# Patient Record
Sex: FEMALE | HISPANIC OR LATINO | Employment: FULL TIME | URBAN - METROPOLITAN AREA
[De-identification: names, ages, dates, MRNs, and addresses within clinical notes are randomized per-mention and may not be internally consistent; named-entity substitution may affect disease eponyms.]

---

## 2017-05-16 ENCOUNTER — TELEPHONE (OUTPATIENT)
Dept: MEDICAL GROUP | Facility: PHYSICIAN GROUP | Age: 32
End: 2017-05-16

## 2017-05-16 NOTE — TELEPHONE ENCOUNTER
ESTABLISHED PATIENT PRE-VISIT PLANNING     Note: Patient will not be contacted if there is no indication to call.     1.  Reviewed note from last office visit with PCP and/or other med group provider: Yes    2.  If any orders were placed at last visit, do we have Results/Consult Notes?        •  Labs - Labs were not ordered at last office visit.       •  Imaging - Imaging ordered, NOT completed. Patient advised to complete prior to next appointment.       •  Referrals - Referral ordered, patient was seen and consult notes are in chart. Care Teams updated  YES.    3.  Immunizations were updated in Epic using WebIZ?: No WebIZ record       •  Web Iz Recommendations: Unknown    4.  Patient is due for the following Health Maintenance Topics:   Health Maintenance Due   Topic Date Due   • IMM DTaP/Tdap/Td Vaccine (1 - Tdap) 12/19/2006   • PAP SMEAR  05/20/2016       - Patient already has appointment scheduled for Immunizations: TDAP. Talk to pt at appointment    5.  Patient was not informed to arrive 15 min prior to their scheduled appointment and bring in their medication bottles.

## 2017-05-17 ENCOUNTER — OFFICE VISIT (OUTPATIENT)
Dept: MEDICAL GROUP | Facility: PHYSICIAN GROUP | Age: 32
End: 2017-05-17
Payer: COMMERCIAL

## 2017-05-17 VITALS
HEART RATE: 77 BPM | SYSTOLIC BLOOD PRESSURE: 112 MMHG | TEMPERATURE: 98.8 F | OXYGEN SATURATION: 98 % | BODY MASS INDEX: 23.95 KG/M2 | RESPIRATION RATE: 16 BRPM | DIASTOLIC BLOOD PRESSURE: 66 MMHG | HEIGHT: 60 IN | WEIGHT: 122 LBS

## 2017-05-17 DIAGNOSIS — B35.1 ONYCHOMYCOSIS: ICD-10-CM

## 2017-05-17 DIAGNOSIS — E78.5 DYSLIPIDEMIA: ICD-10-CM

## 2017-05-17 PROCEDURE — 99213 OFFICE O/P EST LOW 20 MIN: CPT | Performed by: INTERNAL MEDICINE

## 2017-05-17 NOTE — MR AVS SNAPSHOT
Pauline Gaytan   2017 3:40 PM   Office Visit   MRN: [id number]    Department:  [facility]   Dept Phone:  [phone]    Description:  Female : 1985   Provider:  Kandis Muniz M.D.           Allergies as of 2017     Allergen Noted Reactions    Nkda [No Known Drug Allergy] 2013         You were diagnosed with     Onychomycosis   [805467]       Dyslipidemia   [973005]         Vital Signs     Blood Pressure Pulse Temperature Respirations Height Weight    112/66 mmHg 77 37.1 °C (98.8 °F) 16 1.524 m (5') 55.339 kg (122 lb)    Body Mass Index Oxygen Saturation Smoking Status             23.83 kg/m2 98% Never Smoker          Basic Information     Date Of Birth Sex Race Ethnicity Preferred Language    1985 Female  or   Origin (Bengali,Monegasque,Cymraes,Yuriy, etc) English      Problem List              ICD-10-CM Priority Class Noted - Resolved    Hematuria of undiagnosed cause R31.9   2013 - Present    Allergic reaction T78.40XA   2015 - Present      Health Maintenance        Date Due Completion Dates    IMM DTaP/Tdap/Td Vaccine (1 - Tdap) 2006 ---    PAP SMEAR 2016            Current Immunizations     No immunizations on file.      Below and/or attached are the medications your provider expects you to take. Review all of your home medications and newly ordered medications with your provider and/or pharmacist. Follow medication instructions as directed by your provider and/or pharmacist. Please keep your medication list with you and share with your provider. Update the information when medications are discontinued, doses are changed, or new medications (including over-the-counter products) are added; and carry medication information at all times in the event of emergency situations     Allergies:  NKDA - (reactions not documented)               Medications  Valid as of: May 17, 2017 -  4:22 PM    Generic Name Brand Name  Tablet Size Instructions for use    Ciclopirox (Solution) PENLAC 8 % 1 application to affected nail QHS        HydrOXYzine HCl (Tab) ATARAX 25 MG Take 1 Tab by mouth 3 times a day as needed for Itching.        MetroNIDAZOLE (Tab) FLAGYL 500 MG Take 500 mg by mouth 3 times a day.        PredniSONE (Tab) DELTASONE 20 MG Take 3 tab po with food for 3 days, then 2 tabs po with food for 3 days, then 1 tab po with food for 3 days, then 1/2 tab po with food for 4 days.        Probiotic Product (Cap) PROBIOTIC DAILY  Take  by mouth.        Triamcinolone Acetonide (Cream) KENALOG 0.1 % Apply to affected area(s) 2 times a day.        Triamcinolone Acetonide (Cream) KENALOG 0.1 % Apply 0.5 g to affected area(s) 2 times a day.        .                 Medicines prescribed today were sent to:     None      Medication refill instructions:       If your prescription bottle indicates you have medication refills left, it is not necessary to call your provider’s office. Please contact your pharmacy and they will refill your medication.    If your prescription bottle indicates you do not have any refills left, you may request refills at any time through one of the following ways: The online Vehrity system (except Urgent Care), by calling your provider’s office, or by asking your pharmacy to contact your provider’s office with a refill request. Medication refills are processed only during regular business hours and may not be available until the next business day. Your provider may request additional information or to have a follow-up visit with you prior to refilling your medication.   *Please Note: Medication refills are assigned a new Rx number when refilled electronically. Your pharmacy may indicate that no refills were authorized even though a new prescription for the same medication is available at the pharmacy. Please request the medicine by name with the pharmacy before contacting your provider for a refill.        Your To Do  List     Future Labs/Procedures Complete By Expires    LIPOPROTEIN A  As directed 5/17/2018         Modumetal Access Code: [email or web address]  Expires: 6/2/2017  2:28 PM    Modumetal  A secure, online tool to manage your health information     [employer]’s Modumetal® is a secure, online tool that connects you to your personalized health information from the privacy of your home -- day or night - making it very easy for you to manage your healthcare. Once the activation process is completed, you can even access your medical information using the Modumetal michele, which is available for free in the Apple Michele store or Google Play store.     Modumetal provides the following levels of access (as shown below):   My Chart Features   [facility] Doctor [facility]  Specialists [facility]  Urgent  Care [facility]  Primary Care  Doctor   Email your healthcare team securely and privately 24/7 X X X    Manage appointments: schedule your next appointment; view details of past/upcoming appointments X      Request prescription refills. X      View recent personal medical records, including lab and immunizations X X X X   View health record, including health history, allergies, medications X X X X   Read reports about your outpatient visits, procedures, consult and ER notes X X X X   See your discharge summary, which is a recap of your hospital and/or ER visit that includes your diagnosis, lab results, and care plan. X X       How to register for Modumetal:  1. Go to  [email or web address].  2. Click on the Sign Up Now box, which takes you to the New Member Sign Up page. You will need to provide the following information:  a. Enter your Modumetal Access Code exactly as it appears at the top of this page. (You will not need to use this code after you’ve completed the sign-up process. If you do not sign up before the expiration date, you must request a new code.)   b. Enter your date of birth.   c. Enter your home email address.   d. Click  Submit, and follow the next screen’s instructions.  3. Create a Peeriot ID. This will be your Rocketskates login ID and cannot be changed, so think of one that is secure and easy to remember.  4. Create a Peeriot password. You can change your password at any time.  5. Enter your Password Reset Question and Answer. This can be used at a later time if you forget your password.   6. Enter your e-mail address. This allows you to receive e-mail notifications when new information is available in Rocketskates.  7. Click Sign Up. You can now view your health information.    For assistance activating your Rocketskates account, call [phone]

## 2017-05-18 NOTE — PROGRESS NOTES
Subjective:  Pauline is a 31 y.o. female with the following   Past Medical History   Diagnosis Date   • ASTHMA      has a inhailer   • Allergic reaction 8/17/2015      Family History   Problem Relation Age of Onset   • Hypertension Mother    • Hypertension Father    • Arthritis Maternal Grandmother      The patient is on the following medications: Penlac 8% solution    HPI; patient is seen today for follow-up visit, concerned about toe discoloration and thickening she's been using Penlac for couple of years and has seen no significant improvement, requesting alternative therapies, denies having foot pain, erythema or swelling.       ROS:  See HPI    Blood pressure 112/66, pulse 77, temperature 37.1 °C (98.8 °F), resp. rate 16, height 1.524 m (5'), weight 55.339 kg (122 lb), SpO2 98 %, currently breastfeeding.on RA  Objective:  Patient is well appearing and in no acute distress.  Pharynx is clear.  Neck is soft and supple with no cervical or supraclavicular lymphadenopathy, thyromegaly or masses, no JVD.  Lungs clear to auscultation bilaterally with normal respiratory effort. Abdomen soft, non-tender on palpation,not distended. Heart regular rate and rhythm without murmur. Extremities without any clubbing, cyanosis, or edema. Toenail ; discoloration and thickening .      Assessment and Plan:  1. Onychomycosis ; partial response to Penlac solution.     2. Preventive health ; up-to-date on Pap smears by GYN.     3. History of dyslipidemia.       Patient is advised on preventive and supportive care regarding onychomycosis, therapeutic options, for oral medication need to have enough toenail samples to be sent to the laboratory for definitive diagnosis and follow-up with Liver enzymes before and after therapy as indicated. Otherwise she was advised regarding alternative therapies for onychomycosis, informed her that any treatment takes months of  therapy to be effective. Also discussed diet and lifestyle modification and  follow-up with lipid profile. .         Please note that this dictation was created using voice recognition software. I have worked with consultants from the vendor as well as technical experts from [facility] to optimize the interface. I have made every reasonable attempt to correct obvious errors, but I expect that there are errors of grammar and possibly content that I did not discover before finalizing the note.

## 2018-01-08 ENCOUNTER — OFFICE VISIT (OUTPATIENT)
Dept: MEDICAL GROUP | Facility: PHYSICIAN GROUP | Age: 33
End: 2018-01-08
Payer: COMMERCIAL

## 2018-01-08 VITALS
RESPIRATION RATE: 16 BRPM | SYSTOLIC BLOOD PRESSURE: 96 MMHG | OXYGEN SATURATION: 99 % | WEIGHT: 127 LBS | BODY MASS INDEX: 24.94 KG/M2 | DIASTOLIC BLOOD PRESSURE: 70 MMHG | TEMPERATURE: 98.4 F | HEART RATE: 70 BPM | HEIGHT: 60 IN

## 2018-01-08 DIAGNOSIS — M54.2 NECK PAIN: ICD-10-CM

## 2018-01-08 DIAGNOSIS — M79.601 RIGHT ARM PAIN: ICD-10-CM

## 2018-01-08 DIAGNOSIS — B35.1 ONYCHOMYCOSIS: ICD-10-CM

## 2018-01-08 PROCEDURE — 99213 OFFICE O/P EST LOW 20 MIN: CPT | Performed by: INTERNAL MEDICINE

## 2018-01-08 RX ORDER — CICLOPIROX 80 MG/ML
SOLUTION TOPICAL
Qty: 6.6 ML | Refills: 3 | Status: SHIPPED | OUTPATIENT
Start: 2018-01-08 | End: 2020-01-02

## 2018-01-08 ASSESSMENT — PATIENT HEALTH QUESTIONNAIRE - PHQ9: CLINICAL INTERPRETATION OF PHQ2 SCORE: 0

## 2018-01-08 ASSESSMENT — PAIN SCALES - GENERAL: PAINLEVEL: NO PAIN

## 2018-01-08 NOTE — PROGRESS NOTES
Subjective:  Pauline is a 32 y.o. female with the following   Past Medical History:   Diagnosis Date   • Allergic reaction 8/17/2015   • ASTHMA     has a inhailer      Family History   Problem Relation Age of Onset   • Hypertension Mother    • Hypertension Father    • Arthritis Maternal Grandmother      The patient is on the following medications:   Current Outpatient Prescriptions:   •  ciclopirox (PENLAC) 8 % solution, 1 application to affected nail QHS, Disp: 6.6 mL, Rfl: 3  •  Probiotic Product (PROBIOTIC DAILY) Cap, Take  by mouth., Disp: , Rfl:   •  metronidazole (FLAGYL) 500 MG Tab, Take 500 mg by mouth 3 times a day., Disp: , Rfl:   •  predniSONE (DELTASONE) 20 MG Tab, Take 3 tab po with food for 3 days, then 2 tabs po with food for 3 days, then 1 tab po with food for 3 days, then 1/2 tab po with food for 4 days. (Patient not taking: Reported on 1/28/2016), Disp: 20 Tab, Rfl: 0  •  hydrOXYzine (ATARAX) 25 MG Tab, Take 1 Tab by mouth 3 times a day as needed for Itching. (Patient not taking: Reported on 1/28/2016), Disp: 30 Tab, Rfl: 0  •  triamcinolone acetonide (KENALOG) 0.1 % Cream, Apply 0.5 g to affected area(s) 2 times a day. (Patient not taking: Reported on 1/28/2016), Disp: 2 Tube, Rfl: 0  •  triamcinolone acetonide (KENALOG) 0.1 % CREA, Apply to affected area(s) 2 times a day. (Patient not taking: Reported on 1/28/2016), Disp: 1 Tube, Rfl: 1    HPI; Patient is here today concerned about her intermittent upper shoulder, neck and right arm aching associated with tingling and  burning pain especially when waking up in the morning and gradually improves with activity, denies having fall, injury, muscular weakness or loss of sensation. Patient is also requesting prescription of topical Penlac for toenails onychomycosis, stating that combination therapy of sodium bicarbonate soaking and Penlac application seems to be helping.        ROS: All other systems reviewed and they are negative.    Blood pressure (!)  96/70, pulse 70, temperature 36.9 °C (98.4 °F), resp. rate 16, height 1.524 m (5'), weight 57.6 kg (127 lb), SpO2 99 %, not currently breastfeeding.on RA        Objective:      Patient is well appearing and in no acute distress.  Eyes; pupils are equally reactive to light and accommodation. Ears are clear, no tympanic membranes bulging. Pharynx is clear.  Neck is soft and supple, nontender,no thyromegaly or mass.  lymphatic;  no cervical or supraclavicular lymphadenopathy.  Respiratory;  Lungs clear to auscultation bilaterally with normal respiratory effort. Gastrointestinal; abdomen is soft, non-tender on palpation,not distended. Cardiovascular ; Heart regular rate and rhythm without murmur, no JVD.  Extremities without any clubbing, cyanosis, or edema. Neuro - psychiatric ;  alert and oriented to time, location and person, motor and sensory intact. Skin; no rash or erythema. Musculoskeletal; no tenderness on palpation, no joint swelling or erythema    Assessment ;   1. Recurrent right upper shoulder, neck and right arm neuropathic pain. Usually in the morning after waking up, improves with activity. Physical exam is unremarkable, currently is asymptomatic.      2. Onychomycosis; requesting refill of topical Penlac, combination of sodium bicarbonate soaking and topical antifungal seems to be helping..    Plan; Patient is advised on preventive and supportive care regarding current symptoms and potential underlying etiologies including nerve impingement, instructed on upper back and neck extension exercises, appropriate posture and pillow size, If symptoms not improved or worsen return for evaluation. Again discussed alternative therapies for onychomycosis, refilled Penlac solution..    Please note that this dictation was created using voice recognition software. I have worked with consultants from the vendor as well as technical experts from [facility] [employer] to optimize the interface. I have made every reasonable  attempt to correct obvious errors, but I expect that there are errors of grammar and possibly content that I did not discover before finalizing the note.

## 2018-02-07 ENCOUNTER — HOSPITAL ENCOUNTER (OUTPATIENT)
Facility: MEDICAL CENTER | Age: 33
End: 2018-02-07
Attending: OBSTETRICS & GYNECOLOGY
Payer: COMMERCIAL

## 2018-02-07 ENCOUNTER — GYNECOLOGY VISIT (OUTPATIENT)
Dept: OBGYN | Facility: MEDICAL CENTER | Age: 33
End: 2018-02-07
Payer: COMMERCIAL

## 2018-02-07 VITALS
HEIGHT: 60 IN | DIASTOLIC BLOOD PRESSURE: 70 MMHG | WEIGHT: 128 LBS | SYSTOLIC BLOOD PRESSURE: 100 MMHG | BODY MASS INDEX: 25.13 KG/M2

## 2018-02-07 DIAGNOSIS — Z11.51 SPECIAL SCREENING EXAMINATION FOR HUMAN PAPILLOMAVIRUS (HPV): ICD-10-CM

## 2018-02-07 DIAGNOSIS — Z12.4 SCREENING FOR MALIGNANT NEOPLASM OF CERVIX: ICD-10-CM

## 2018-02-07 DIAGNOSIS — Z97.5 IUD (INTRAUTERINE DEVICE) IN PLACE: ICD-10-CM

## 2018-02-07 DIAGNOSIS — Z01.419 WELL WOMAN EXAM: ICD-10-CM

## 2018-02-07 DIAGNOSIS — R19.00 PELVIC MASS IN FEMALE: ICD-10-CM

## 2018-02-07 PROCEDURE — 88175 CYTOPATH C/V AUTO FLUID REDO: CPT

## 2018-02-07 PROCEDURE — 99385 PREV VISIT NEW AGE 18-39: CPT | Performed by: OBSTETRICS & GYNECOLOGY

## 2018-02-07 PROCEDURE — 87624 HPV HI-RISK TYP POOLED RSLT: CPT

## 2018-02-07 NOTE — PROGRESS NOTES
ANNUAL Gynecologic Exam     HPI Comments:   32 YEAR OLD  presents for well woman exam and establish gyn care  She has the IUD mirena - placed in   Patient's last menstrual period was 2014.  Occasional light vaginal spotting  No pelvic pains, no dyspareunia  (+) right breast pain - points to area where bra's under wire is  Healthy  Never smoker  No family history of breast/ovarian cancer    Review of Systems   Pertinent positives documented in HPI and all other systems reviewed & are negative    All PMH, PSH, allergies, social history and FH reviewed and updated today:  Past Medical History:   Diagnosis Date   • Allergic reaction 2015   • ASTHMA     has a inhailer     No past surgical history on file.  Nkda [no known drug allergy]  Social History     Social History   • Marital status:      Spouse name: N/A   • Number of children: N/A   • Years of education: N/A     Social History Main Topics   • Smoking status: Never Smoker   • Smokeless tobacco: Never Used   • Alcohol use No   • Drug use: No   • Sexual activity: Yes     Partners: Male     Other Topics Concern   • Not on file     Social History Narrative   • No narrative on file     Family History   Problem Relation Age of Onset   • Hypertension Mother    • Hypertension Father    • Arthritis Maternal Grandmother      Medications:   Current Outpatient Prescriptions Ordered in [employer]   Medication Sig Dispense Refill   • ciclopirox (PENLAC) 8 % solution 1 application to affected nail QHS 6.6 mL 3   • metronidazole (FLAGYL) 500 MG Tab Take 500 mg by mouth 3 times a day.     • Probiotic Product (PROBIOTIC DAILY) Cap Take  by mouth.     • predniSONE (DELTASONE) 20 MG Tab Take 3 tab po with food for 3 days, then 2 tabs po with food for 3 days, then 1 tab po with food for 3 days, then 1/2 tab po with food for 4 days. (Patient not taking: Reported on 2016) 20 Tab 0   • hydrOXYzine (ATARAX) 25 MG Tab Take 1 Tab by mouth 3 times a day as needed  for Itching. (Patient not taking: Reported on 1/28/2016) 30 Tab 0   • triamcinolone acetonide (KENALOG) 0.1 % Cream Apply 0.5 g to affected area(s) 2 times a day. (Patient not taking: Reported on 1/28/2016) 2 Tube 0   • triamcinolone acetonide (KENALOG) 0.1 % CREA Apply to affected area(s) 2 times a day. (Patient not taking: Reported on 1/28/2016) 1 Tube 1     No current Epic-ordered facility-administered medications on file.       Objective:   Vital measurements:  Blood pressure 100/70, height 1.524 m (5'), weight 58.1 kg (128 lb), last menstrual period 02/01/2014.  Body mass index is 25 kg/m². (Goal BM I>18 <25)    Physical Exam   Nursing note and vitals reviewed.  Constitutional: She is oriented to person, place, and time. She appears well-developed and well-nourished. No distress.     HEENT:   Head: Normocephalic and atraumatic.   Right Ear: External ear normal.   Left Ear: External ear normal.   Nose: Nose normal.   Eyes: Conjunctivae and EOM are normal. Pupils are equal, round, and reactive to light. No scleral icterus.     Neck: Normal range of motion. Neck supple. No tracheal deviation present. No thyromegaly present.     Pulmonary/Chest: Effort normal and breath sounds normal. No respiratory distress. She has no wheezes. She has no rales. She exhibits no tenderness.     Cardiovascular: Regular, rate and rhythm. No JVD.    Abdominal: Soft. Bowel sounds are normal. She exhibits no distension and no mass. No tenderness. She has no rebound and no guarding.     Breast:  Symmetrical, normal consistency without masses., No dimpling or skin changes, negative, No masses    Genitourinary:  Pelvic exam was performed with patient supine.  External genitalia with no abnormal pigmentation, labial fusion,rash, tenderness, lesion or injury to the labia bilaterally.  Vagina is moist with no lesions, foul discharge, erythema, tenderness or bleeding. No foreign body around the vagina or signs of injury.   Cervix exhibits no  motion tenderness, no discharge and no friability. IUD string seen  Uterus is  retroverted deviated, not enlarged, not fixed and not tender.  (+) movable mass anterior to the uterus about 4-5 cm no tenderness    Musculoskeletal: Normal range of motion. She exhibits no edema and no tenderness.     Lymphadenopathy: She has no cervical adenopathy.     Neurological: She is alert and oriented to person, place, and time. She exhibits normal muscle tone.     Skin: Skin is warm and dry. No rash noted. She is not diaphoretic. No erythema. No pallor.     Psychiatric: She has a normal mood and affect. Her behavior is normal. Judgment and thought content normal  Assessment:     1. Well woman exam  THINPREP PAP WITH HPV   2. Screening for malignant neoplasm of cervix  THINPREP PAP WITH HPV   3. Special screening examination for human papillomavirus (HPV)  THINPREP PAP WITH HPV   4. Pelvic mass in female  US-GYN-PELVIS TRANSVAGINAL   5. IUD (intrauterine device) in place  US-GYN-PELVIS TRANSVAGINAL     Plan:   Pap and physical exam performed. Then q 3 years if normal   Monthly SBE. Pain that pt is complaining about seems like musculo-skeletal pain underneath right breast from under wire. Will d/c using under wire, will call gyn office if pain is worse or persistent for evaluation of right breast. Agreed with plan  Counseling: breast self exam, STD prevention, HIV risk factors and prevention and family planning choices. Pt will call office 1 month prior to expiration of IUD mirena for replacement - IUD due feb-march 2019  Encourage exercise and proper diet.  Mammograms starting @ age 40 annually.  See medications and orders placed in encounter report.

## 2018-02-08 LAB
CYTOLOGY REG CYTOL: NORMAL
HPV HR 12 DNA CVX QL NAA+PROBE: NEGATIVE
HPV16 DNA SPEC QL NAA+PROBE: NEGATIVE
HPV18 DNA SPEC QL NAA+PROBE: NEGATIVE
SPECIMEN SOURCE: NORMAL

## 2018-02-16 ENCOUNTER — HOSPITAL ENCOUNTER (OUTPATIENT)
Dept: RADIOLOGY | Facility: MEDICAL CENTER | Age: 33
End: 2018-02-16
Attending: OBSTETRICS & GYNECOLOGY
Payer: COMMERCIAL

## 2018-02-16 DIAGNOSIS — R19.00 PELVIC MASS IN FEMALE: ICD-10-CM

## 2018-02-16 DIAGNOSIS — Z97.5 IUD (INTRAUTERINE DEVICE) IN PLACE: ICD-10-CM

## 2018-02-16 PROCEDURE — 76830 TRANSVAGINAL US NON-OB: CPT

## 2018-02-26 ENCOUNTER — TELEPHONE (OUTPATIENT)
Dept: OBGYN | Facility: MEDICAL CENTER | Age: 33
End: 2018-02-26

## 2018-02-27 NOTE — TELEPHONE ENCOUNTER
Called yesterday and gave the pt her results and pt would like to be squeezed in anytime after 10 am as her insurance will be changing. Is this possible doctor? Please advise? Thank you

## 2018-08-16 ENCOUNTER — OFFICE VISIT (OUTPATIENT)
Dept: MEDICAL GROUP | Facility: MEDICAL CENTER | Age: 33
End: 2018-08-16
Payer: COMMERCIAL

## 2018-08-16 VITALS
DIASTOLIC BLOOD PRESSURE: 72 MMHG | HEIGHT: 60 IN | HEART RATE: 60 BPM | OXYGEN SATURATION: 97 % | TEMPERATURE: 98 F | BODY MASS INDEX: 25.13 KG/M2 | WEIGHT: 128 LBS | SYSTOLIC BLOOD PRESSURE: 116 MMHG

## 2018-08-16 DIAGNOSIS — Z00.00 ANNUAL PHYSICAL EXAM: ICD-10-CM

## 2018-08-16 DIAGNOSIS — B35.1 ONYCHOMYCOSIS OF TOENAIL: ICD-10-CM

## 2018-08-16 PROBLEM — G43.909 MIGRAINE: Status: ACTIVE | Noted: 2018-08-16

## 2018-08-16 PROBLEM — J45.20 MILD INTERMITTENT ASTHMA WITHOUT COMPLICATION: Status: ACTIVE | Noted: 2018-08-16

## 2018-08-16 PROCEDURE — 99214 OFFICE O/P EST MOD 30 MIN: CPT | Performed by: NURSE PRACTITIONER

## 2018-08-16 NOTE — PATIENT INSTRUCTIONS
Fungal Nail Infection  Introduction  Fungal nail infection is a common fungal infection of the toenails or fingernails. This condition affects toenails more often than fingernails. More than one nail may be infected. The condition can be passed from person to person (is contagious).  What are the causes?  This condition is caused by a fungus. Several types of funguses can cause the infection. These funguses are common in moist and warm areas. If your hands or feet come into contact with the fungus, it may get into a crack in your fingernail or toenail and cause the infection.  What increases the risk?  The following factors may make you more likely to develop this condition:  · Being male.  · Having diabetes.  · Being of older age.  · Living with someone who has the fungus.  · Walking barefoot in areas where the fungus thrives, such as showers or locker rooms.  · Having poor circulation.  · Wearing shoes and socks that cause your feet to sweat.  · Having athlete’s foot.  · Having a nail injury or history of a recent nail surgery.  · Having psoriasis.  · Having a weak body defense system (immune system).  What are the signs or symptoms?  Symptoms of this condition include:  · A pale spot on the nail.  · Thickening of the nail.  · A nail that becomes yellow or brown.  · A brittle or ragged nail edge.  · A crumbling nail.  · A nail that has lifted away from the nail bed.  How is this diagnosed?  This condition is diagnosed with a physical exam. Your health care provider may take a scraping or clipping from your nail to test for the fungus.  How is this treated?  Mild infections do not need treatment. If you have significant nail changes, treatment may include:  · Oral antifungal medicines. You may need to take the medicine for several weeks or several months, and you may not see the results for a long time. These medicines can cause side effects. Ask your health care provider what problems to watch for.  · Antifungal  nail polish and nail cream. These may be used along with oral antifungal medicines.  · Laser treatment of the nail.  · Surgery to remove the nail. This may be needed for the most severe infections.  Treatment takes a long time, and the infection may come back.  Follow these instructions at home:  Medicines  · Take or apply over-the-counter and prescription medicines only as told by your health care provider.  · Ask your health care provider about using over-the-counter mentholated ointment on your nails.  Lifestyle  · Do not share personal items, such as towels or nail clippers.  · Trim your nails often.  · Wash and dry your hands and feet every day.  · Wear absorbent socks, and change your socks frequently.  · Wear shoes that allow air to circulate, such as sandals or canvas tennis shoes. Throw out old shoes.  · Wear rubber gloves if you are working with your hands in wet areas.  · Do not walk barefoot in shower rooms or locker rooms.  · Do not use a nail salon that does not use clean instruments.  · Do not use artificial nails.  General instructions  · Keep all follow-up visits as told by your health care provider. This is important.  · Use antifungal foot powder on your feet and in your shoes.  Contact a health care provider if:  Your infection is not getting better or it is getting worse after several months.  This information is not intended to replace advice given to you by your health care provider. Make sure you discuss any questions you have with your health care provider.  Document Released: 12/15/2001 Document Revised: 05/25/2017 Document Reviewed: 06/20/2016  © 2017 Elsevier    Terbinafine tablets  What is this medicine?  TERBINAFINE (TER bin a feen) is an antifungal medicine. It is used to treat certain kinds of fungal or yeast infections.  This medicine may be used for other purposes; ask your health care provider or pharmacist if you have questions.  COMMON BRAND NAME(S): Lamisil, Terbinex  What  should I tell my health care provider before I take this medicine?  They need to know if you have any of these conditions:  -drink alcoholic beverages  -kidney disease  -liver disease  -an unusual or allergic reaction to terbinafine, other medicines, foods, dyes, or preservatives  -pregnant or trying to get pregnant  -breast-feeding  How should I use this medicine?  Take this medicine by mouth with a full glass of water. Follow the directions on the prescription label. You can take this medicine with food or on an empty stomach. Take your medicine at regular intervals. Do not take your medicine more often than directed. Do not skip doses or stop your medicine early even if you feel better. Do not stop taking except on your doctor's advice. Talk to your pediatrician regarding the use of this medicine in children. Special care may be needed.  Overdosage: If you think you have taken too much of this medicine contact a poison control center or emergency room at once.  NOTE: This medicine is only for you. Do not share this medicine with others.  What if I miss a dose?  If you miss a dose, take it as soon as you can. If it is almost time for your next dose, take only that dose. Do not take double or extra doses.  What may interact with this medicine?  Do not take this medicine with any of the following medications:  -thioridazine  This medicine may also interact with the following medications:  -beta-blockers  -caffeine  -cimetidine  -cyclosporine  -medicines for depression, anxiety, or psychotic disturbances  -medicines for fungal infections like fluconazole and ketoconazole  -medicines for irregular heartbeat like amiodarone, flecainide and propafenone  -rifampin  -warfarin  This list may not describe all possible interactions. Give your health care provider a list of all the medicines, herbs, non-prescription drugs, or dietary supplements you use. Also tell them if you smoke, drink alcohol, or use illegal drugs. Some  items may interact with your medicine.  What should I watch for while using this medicine?  Visit your doctor or health care provider regularly. Tell your doctor right away if you have nausea or vomiting, loss of appetite, stomach pain on your right upper side, yellow skin, dark urine, light stools, or are over tired. Some fungal infections need many weeks or months of treatment to cure. If you are taking this medicine for a long time, you will need to have important blood work done.  What side effects may I notice from receiving this medicine?  Side effects that you should report to your doctor or health care professional as soon as possible:  -allergic reactions like skin rash or hives, swelling of the face, lips, or tongue  -changes in vision  -dark urine  -fever or infection  -general ill feeling or flu-like symptoms  -light-colored stools  -loss of appetite, nausea  -redness, blistering, peeling or loosening of the skin, including inside the mouth  -right upper belly pain  -unusually weak or tired  -yellowing of the eyes or skin  Side effects that usually do not require medical attention (report to your doctor or health care professional if they continue or are bothersome):  -changes in taste  -diarrhea  -hair loss  -muscle or joint pain  -stomach gas  -stomach upset  This list may not describe all possible side effects. Call your doctor for medical advice about side effects. You may report side effects to FDA at 8-906-FDA-1339.  Where should I keep my medicine?  Keep out of the reach of children.  Store at room temperature below 25 degrees C (77 degrees F). Protect from light. Throw away any unused medicine after the expiration date.  NOTE: This sheet is a summary. It may not cover all possible information. If you have questions about this medicine, talk to your doctor, pharmacist, or health care provider.  © 2018 Elsevier/Gold Standard (2009-02-27 16:28:07)

## 2018-08-17 ENCOUNTER — HOSPITAL ENCOUNTER (OUTPATIENT)
Dept: LAB | Facility: MEDICAL CENTER | Age: 33
End: 2018-08-17
Attending: NURSE PRACTITIONER
Payer: COMMERCIAL

## 2018-08-17 ENCOUNTER — TELEPHONE (OUTPATIENT)
Dept: MEDICAL GROUP | Facility: MEDICAL CENTER | Age: 33
End: 2018-08-17

## 2018-08-17 DIAGNOSIS — B35.1 ONYCHOMYCOSIS: ICD-10-CM

## 2018-08-17 DIAGNOSIS — Z00.00 ANNUAL PHYSICAL EXAM: ICD-10-CM

## 2018-08-17 DIAGNOSIS — Z51.81 THERAPEUTIC DRUG MONITORING: ICD-10-CM

## 2018-08-17 LAB
25(OH)D3 SERPL-MCNC: 25 NG/ML (ref 30–100)
ALBUMIN SERPL BCP-MCNC: 4.2 G/DL (ref 3.2–4.9)
ALBUMIN/GLOB SERPL: 1.4 G/DL
ALP SERPL-CCNC: 41 U/L (ref 30–99)
ALT SERPL-CCNC: 15 U/L (ref 2–50)
ANION GAP SERPL CALC-SCNC: 5 MMOL/L (ref 0–11.9)
AST SERPL-CCNC: 19 U/L (ref 12–45)
BASOPHILS # BLD AUTO: 0.4 % (ref 0–1.8)
BASOPHILS # BLD: 0.02 K/UL (ref 0–0.12)
BILIRUB SERPL-MCNC: 0.7 MG/DL (ref 0.1–1.5)
BUN SERPL-MCNC: 13 MG/DL (ref 8–22)
CALCIUM SERPL-MCNC: 9.6 MG/DL (ref 8.5–10.5)
CHLORIDE SERPL-SCNC: 104 MMOL/L (ref 96–112)
CHOLEST SERPL-MCNC: 148 MG/DL (ref 100–199)
CO2 SERPL-SCNC: 27 MMOL/L (ref 20–33)
CREAT SERPL-MCNC: 0.88 MG/DL (ref 0.5–1.4)
EOSINOPHIL # BLD AUTO: 0.06 K/UL (ref 0–0.51)
EOSINOPHIL NFR BLD: 1.1 % (ref 0–6.9)
ERYTHROCYTE [DISTWIDTH] IN BLOOD BY AUTOMATED COUNT: 42.3 FL (ref 35.9–50)
GLOBULIN SER CALC-MCNC: 3 G/DL (ref 1.9–3.5)
GLUCOSE SERPL-MCNC: 91 MG/DL (ref 65–99)
HCT VFR BLD AUTO: 42.3 % (ref 37–47)
HDLC SERPL-MCNC: 48 MG/DL
HGB BLD-MCNC: 14 G/DL (ref 12–16)
IMM GRANULOCYTES # BLD AUTO: 0.01 K/UL (ref 0–0.11)
IMM GRANULOCYTES NFR BLD AUTO: 0.2 % (ref 0–0.9)
LDLC SERPL CALC-MCNC: 84 MG/DL
LYMPHOCYTES # BLD AUTO: 1.99 K/UL (ref 1–4.8)
LYMPHOCYTES NFR BLD: 36.1 % (ref 22–41)
MCH RBC QN AUTO: 30.9 PG (ref 27–33)
MCHC RBC AUTO-ENTMCNC: 33.1 G/DL (ref 33.6–35)
MCV RBC AUTO: 93.4 FL (ref 81.4–97.8)
MONOCYTES # BLD AUTO: 0.47 K/UL (ref 0–0.85)
MONOCYTES NFR BLD AUTO: 8.5 % (ref 0–13.4)
NEUTROPHILS # BLD AUTO: 2.97 K/UL (ref 2–7.15)
NEUTROPHILS NFR BLD: 53.7 % (ref 44–72)
NRBC # BLD AUTO: 0 K/UL
NRBC BLD-RTO: 0 /100 WBC
PLATELET # BLD AUTO: 229 K/UL (ref 164–446)
PMV BLD AUTO: 12.1 FL (ref 9–12.9)
POTASSIUM SERPL-SCNC: 4.7 MMOL/L (ref 3.6–5.5)
PROT SERPL-MCNC: 7.2 G/DL (ref 6–8.2)
RBC # BLD AUTO: 4.53 M/UL (ref 4.2–5.4)
SODIUM SERPL-SCNC: 136 MMOL/L (ref 135–145)
TRIGL SERPL-MCNC: 82 MG/DL (ref 0–149)
TSH SERPL DL<=0.005 MIU/L-ACNC: 1.1 UIU/ML (ref 0.38–5.33)
WBC # BLD AUTO: 5.5 K/UL (ref 4.8–10.8)

## 2018-08-17 PROCEDURE — 80061 LIPID PANEL: CPT

## 2018-08-17 PROCEDURE — 85025 COMPLETE CBC W/AUTO DIFF WBC: CPT

## 2018-08-17 PROCEDURE — 82306 VITAMIN D 25 HYDROXY: CPT

## 2018-08-17 PROCEDURE — 84443 ASSAY THYROID STIM HORMONE: CPT

## 2018-08-17 PROCEDURE — 36415 COLL VENOUS BLD VENIPUNCTURE: CPT

## 2018-08-17 PROCEDURE — 80053 COMPREHEN METABOLIC PANEL: CPT

## 2018-08-17 RX ORDER — TERBINAFINE HYDROCHLORIDE 250 MG/1
250 TABLET ORAL DAILY
Qty: 30 TAB | Refills: 2 | Status: SHIPPED | OUTPATIENT
Start: 2018-08-17 | End: 2020-01-02

## 2018-08-17 NOTE — PROGRESS NOTES
Pauline Gaytan is a 32 y.o. female here to establish care and discuss the following:    HPI:   Onychomycosis of toenail  Patient has had issues with toenail fungus for the past year and a half.  She was seen by a previous provider who prescribed her topical treatment for this.  She has been using this treatment nightly for 1 year without any improvement.  It originally started when her foot was stepped on during soccer practice and she lost her toenail.  Once it grew back it had developed a fungal infection which spread to the rest of the toes on her left foot.  Her sister has also had issues with toenail fungus and was placed on oral therapy which worked well for her.  Patient has a low alcohol consumption.  No history of liver issues.  She is willing to abstain from using alcohol while on oral therapy.  She has not had labs for 3 years.    Current medicines (including changes today)  Current Outpatient Prescriptions   Medication Sig Dispense Refill   • ciclopirox (PENLAC) 8 % solution 1 application to affected nail QHS 6.6 mL 3   • Probiotic Product (PROBIOTIC DAILY) Cap Take  by mouth.       No current facility-administered medications for this visit.      She  has a past medical history of Allergic reaction (8/17/2015); ASTHMA; Migraine (8/16/2018); and Mild intermittent asthma without complication (8/16/2018). She also has no past medical history of Allergy; Headache(784.0); Migraine; or Ulcer.  She  has no past surgical history on file.  Social History   Substance Use Topics   • Smoking status: Never Smoker   • Smokeless tobacco: Never Used   • Alcohol use Yes      Comment: 1/week     Social History     Social History Narrative   • No narrative on file     Family History   Problem Relation Age of Onset   • Hypertension Mother    • Hypertension Father    • Arthritis Maternal Grandmother    • Autoimmune Disease Sister 18        lupus   • Stroke Brother 31   • No Known Problems Brother    • Diabetes  Maternal Uncle    • Diabetes Paternal Aunt      Family Status   Relation Status   • Mo Alive   • Fa Alive   • MGMo Alive   • Sis Alive   • Bro Alive   • MGFa    • PGMo    • PGFa Alive   • Bro Alive   • MUnc    • PAunt Alive         ROS  No chest pain, no abdominal pain, no rash.  Positive ROS as per HPI.  All other systems reviewed and are negative      Objective:     Blood pressure 116/72, pulse 60, temperature 36.7 °C (98 °F), height 1.524 m (5'), weight 58.1 kg (128 lb), SpO2 97 %, not currently breastfeeding. Body mass index is 25 kg/m².     Physical Exam:    Constitutional: Alert, no distress.  Skin: Warm, dry, good turgor, no rashes in visible areas.  Eye: Equal, round and reactive, conjunctiva clear, lids normal.  ENMT: Lips without lesions, good dentition  Neck: Trachea midline  Respiratory: Unlabored respiratory effort  Cardiovascular: No edema.  Psych: Alert and oriented x3, normal affect and mood.      Assessment and Plan:   The following treatment plan was discussed    1. Annual physical exam  Check labs, follow-up for annual  - CBC WITH DIFFERENTIAL; Future  - COMP METABOLIC PANEL; Future  - LIPID PROFILE; Future  - TSH WITH REFLEX TO FT4; Future  - VITAMIN D,25 HYDROXY; Future    2. Onychomycosis of toenail  Unstable.  Check baseline liver function  If liver function normal, will start terbinafine 250 mg daily for 12 weeks.  We will then monitor liver function once a month.  Educational information given on onychomycosis and terbinafine.      Followup: Return in about 4 weeks (around 2018) for Annual.    I have placed the below orders and discussed them with an approved delegating provider. The MA is performing the below orders under the direction of Dr. Ojeda

## 2018-08-17 NOTE — ASSESSMENT & PLAN NOTE
Patient has had issues with toenail fungus for the past year and a half.  She was seen by a previous provider who prescribed her topical treatment for this.  She has been using this treatment nightly for 1 year without any improvement.  It originally started when her foot was stepped on during soccer practice and she lost her toenail.  Once it grew back it had developed a fungal infection which spread to the rest of the toes on her left foot.  Her sister has also had issues with toenail fungus and was placed on oral therapy which worked well for her.  Patient has a low alcohol consumption.  No history of liver issues.  She is willing to abstain from using alcohol while on oral therapy.  She has not had labs for 3 years.

## 2018-08-18 NOTE — TELEPHONE ENCOUNTER
Baseline liver enzymes normal.  Terbinafine ordered for 12 week course as discussed at last appointment.  Will monitor liver function tests monthly.  Patient advised not to drink any alcohol while on this medication.  She verbalized understanding at previous appointment.  Labs ordered.    GLORY Sierra.

## 2018-08-20 ENCOUNTER — GYNECOLOGY VISIT (OUTPATIENT)
Dept: OBGYN | Facility: MEDICAL CENTER | Age: 33
End: 2018-08-20
Payer: COMMERCIAL

## 2018-08-20 ENCOUNTER — TELEPHONE (OUTPATIENT)
Dept: MEDICAL GROUP | Facility: MEDICAL CENTER | Age: 33
End: 2018-08-20

## 2018-08-20 VITALS
HEIGHT: 60 IN | SYSTOLIC BLOOD PRESSURE: 90 MMHG | DIASTOLIC BLOOD PRESSURE: 68 MMHG | WEIGHT: 131 LBS | BODY MASS INDEX: 25.72 KG/M2

## 2018-08-20 DIAGNOSIS — R10.2 PELVIC PAIN: ICD-10-CM

## 2018-08-20 DIAGNOSIS — N83.201 CYST OF RIGHT OVARY: ICD-10-CM

## 2018-08-20 PROCEDURE — 99214 OFFICE O/P EST MOD 30 MIN: CPT | Performed by: OBSTETRICS & GYNECOLOGY

## 2018-08-20 NOTE — TELEPHONE ENCOUNTER
----- Message from KENIA Sierra sent at 8/17/2018  5:19 PM PDT -----  Will discuss lab results at future appointment.   KENIA Sierra

## 2018-08-20 NOTE — PROGRESS NOTES
Subjective:      Pauline Gaytan is a 32 y.o. female who presents with Gynecologic Exam (IUD Issues)        CC: pain with intercourse, bleeding with intercourse.     HPI: 31 yo  with Mirena IUD in place (expires ) presents with complaint of pain and bleeding with intercourse.  She has intermittent spotting with intercourse, usually no bleeding with Mirena.  Also complains of pelvic pain with sex, and some pain with lying on back.  The pain in intermittent, 7/10 intensity. She has hx of a right ovarian cyst in 2019 measuring 6.8 cm.  No nausea, vomiting, fevers, chills, dysuria, or vaginal discharge.     ROS REVIEW OF SYSTEMS:    Pertinent positives and negatives mentioned in HPI.    All other systems reviewed and are negative or noncontributory.       Objective:     BP (!) 90/68   Ht 1.524 m (5')   Wt 59.4 kg (131 lb)   Breastfeeding? No   BMI 25.58 kg/m²      Physical Exam      GENERAL: Alert, in no apparent distress  PSYCHIATRIC: Appropriate affect, intact insight and judgement.  NECK:  Nontender, no masses.  No Thyromegaly or nodules. No lymphadenopathy.  RESPIRATORY: Normal respiratory effort.  Lungs clear to auscultation.   CARDIOVASCULAR: RRR, no murmur, gallop, or rub.  ABDOMEN: Soft, nontender, nondistended.  No palpable masses.  No rebound or guarding.  No inguinal lymphadenopathy.  No hepatosplenomegaly.  No hernias.  BACK: No CVA tenderness  EXTREMITIES: No edema  SKIN: No rash    GENITOURINARY:  Normal external genitalia, no lesions.  Normal urethral meatus, no masses or tenderness.  Normal bladder without fullness or masses.  Vagina well estrogenized, no vaginal discharge or lesions.  Cervix without lesions or discharge, nontender.  IUD strings present at cervical os.  Uterus normal size, shape, and contour, nontender.  Adnexa  - right palpable mass, mobile, tender, 5 cm.  Left - NT, no masses.   Normal anus and perineum.    Rectal Exam - not indicated.    US report reviewed  from 2/16/18 - right ovarian cyst with septation measuring 6.8 cm     Assessment/Plan:     1. Pelvic pain  - US-GYN-PELVIS TRANSVAGINAL; Future  Suspect persistence of ovarian cyst.     2. Cyst of right ovary  If cyst persistent, recommend laparoscopic cystectomy  - US-GYN-PELVIS TRANSVAGINAL; Future    Will call with results and further management.

## 2018-08-31 ENCOUNTER — HOSPITAL ENCOUNTER (OUTPATIENT)
Dept: RADIOLOGY | Facility: MEDICAL CENTER | Age: 33
End: 2018-08-31
Attending: OBSTETRICS & GYNECOLOGY
Payer: COMMERCIAL

## 2018-08-31 DIAGNOSIS — N83.201 CYST OF RIGHT OVARY: ICD-10-CM

## 2018-08-31 DIAGNOSIS — R10.2 PELVIC PAIN: ICD-10-CM

## 2018-08-31 PROCEDURE — 76830 TRANSVAGINAL US NON-OB: CPT

## 2018-09-11 ENCOUNTER — TELEPHONE (OUTPATIENT)
Dept: OBGYN | Facility: CLINIC | Age: 33
End: 2018-09-11

## 2018-09-11 NOTE — TELEPHONE ENCOUNTER
----- Message from Elli Emanuel M.D. sent at 9/10/2018  4:09 PM PDT -----  Regarding: FW: Test Result Question  Contact: [phone]  She can try tylenol 1000 mg po q 6 hours or motrin 800 mg po q 8 hours.  Please have her f/u in 6 weeks for an appointment.  If she her symptoms improve, she may cancel the appointment.   ----- Message -----  From: Tex Montejo, Med Ass't  Sent: 9/10/2018   3:26 PM  To: Elli Emanuel M.D.  Subject: FW: Test Result Question                         Hi Dr. Emanuel,    I let this PT know about her U/S results and she's stating that she's still having some pain on her left side. She's very happy to know that the cyst on the right side resolved itself. She's wanting to know what can she do about the pain and if/when she'll need another follow up appointment.    Please advise thank you,    ANKUR Nice    ----- Message -----  From: Pauline Gaytan  Sent: 9/10/2018   3:10 PM  To: [facility]  Subject: Test Result Question                             Hello I was wanted to check if you had an opportunity to read the test results?  I wanted to check if we still needed to go forward with the operation?

## 2019-10-23 ENCOUNTER — OFFICE VISIT (OUTPATIENT)
Dept: MEDICAL GROUP | Facility: MEDICAL CENTER | Age: 34
End: 2019-10-23
Payer: COMMERCIAL

## 2019-10-23 VITALS
HEART RATE: 77 BPM | OXYGEN SATURATION: 98 % | TEMPERATURE: 98 F | BODY MASS INDEX: 25.52 KG/M2 | SYSTOLIC BLOOD PRESSURE: 104 MMHG | RESPIRATION RATE: 16 BRPM | WEIGHT: 130 LBS | HEIGHT: 60 IN | DIASTOLIC BLOOD PRESSURE: 68 MMHG

## 2019-10-23 DIAGNOSIS — N93.9 VAGINAL BLEEDING: ICD-10-CM

## 2019-10-23 DIAGNOSIS — Z97.5 IUD (INTRAUTERINE DEVICE) IN PLACE: ICD-10-CM

## 2019-10-23 DIAGNOSIS — R10.30 LOWER ABDOMINAL PAIN: ICD-10-CM

## 2019-10-23 LAB
APPEARANCE UR: CLEAR
COLOR UR AUTO: YELLOW
GLUCOSE UR STRIP.AUTO-MCNC: NORMAL MG/DL
INT CON NEG: NEGATIVE
INT CON POS: POSITIVE
KETONES UR STRIP.AUTO-MCNC: NORMAL MG/DL
LEUKOCYTE ESTERASE UR QL STRIP.AUTO: NORMAL
NITRITE UR QL STRIP.AUTO: NORMAL
PH UR STRIP.AUTO: 6 [PH] (ref 5–8)
POC URINE PREGNANCY TEST: NEGATIVE
PROT UR QL STRIP: NORMAL MG/DL
RBC UR QL AUTO: NORMAL
SP GR UR STRIP.AUTO: 1.02

## 2019-10-23 PROCEDURE — 99214 OFFICE O/P EST MOD 30 MIN: CPT | Performed by: NURSE PRACTITIONER

## 2019-10-23 PROCEDURE — 81002 URINALYSIS NONAUTO W/O SCOPE: CPT | Performed by: NURSE PRACTITIONER

## 2019-10-23 PROCEDURE — 81025 URINE PREGNANCY TEST: CPT | Performed by: NURSE PRACTITIONER

## 2019-10-23 NOTE — ASSESSMENT & PLAN NOTE
Ongoing for the past month. Has been having bad pain and cramps in the night, some lower back pain, pain with intercourse. Has had some nausea in the morning, and last night after going to the gym.     Was diagnosed with ovarian cysts by her OBGYN who stated that this may be why she is having painful intercourse.     Got her mirena IUD replaced in February. Previously had no menstrual cycles or spotting, since her new IUD has been having random heavy bleeding.     Took a pregnancy test and was negative. Has had UTIs frequently in the past.     Has had some body aches, no fevers or chills.

## 2019-10-24 NOTE — PROGRESS NOTES
Subjective:   Pauline Perrin is a 33 y.o. female here today for the following concerns    Lower abdominal pain  Ongoing for the past month. Has been having bad pain and cramps in the night, some lower back pain, pain with intercourse. Has had some nausea in the morning, and last night after going to the gym.     Was diagnosed with ovarian cysts by her OBGYN who stated that this may be why she is having painful intercourse.     Got her mirena IUD replaced in February. Previously had no menstrual cycles or spotting, since her new IUD has been having random heavy bleeding.     Took a pregnancy test and was negative. Has had UTIs frequently in the past.     Has had some body aches, no fevers or chills.        Current medicines (including changes today)  Current Outpatient Medications   Medication Sig Dispense Refill   • terbinafine (LAMISIL) 250 MG Tab Take 1 Tab by mouth every day. 30 Tab 2   • ciclopirox (PENLAC) 8 % solution 1 application to affected nail QHS 6.6 mL 3   • Probiotic Product (PROBIOTIC DAILY) Cap Take  by mouth.       No current facility-administered medications for this visit.      She  has a past medical history of Allergic reaction (8/17/2015), ASTHMA, Migraine (8/16/2018), and Mild intermittent asthma without complication (8/16/2018). She also has no past medical history of Allergy, Headache(784.0), Migraine, or Ulcer.    ROS   No chest pain, no shortness of breath, no abdominal pain  Positive ROS as per HPI.  All other systems reviewed and are negative.     Objective:     /68 (BP Location: Right arm, Patient Position: Sitting, BP Cuff Size: Adult)   Pulse 77   Temp 36.7 °C (98 °F) (Temporal)   Resp 16   Ht 1.524 m (5')   Wt 59 kg (130 lb)   SpO2 98%  Body mass index is 25.39 kg/m².     Physical Exam:  Constitutional: Alert, no distress.  Skin: Warm, dry, good turgor, no rashes in visible areas.  Eye: Equal, round, conjunctiva clear, lids normal.  ENMT: Lips without lesions,  good dentition  Neck: Trachea midline  Respiratory: Unlabored respiratory effort, lungs clear to auscultation, no wheezes, no ronchi.  Cardiovascular: Normal S1, S2, no murmur, no edema.  Abdomen: Soft, mid pelvic tenderness, no masses, no hepatosplenomegaly.  Psych: Alert and oriented x3, normal affect and mood.      Assessment and Plan:   The following treatment plan was discussed    1. Lower abdominal pain  Unstable  POC UA positive for blood, patient states this is chronic  POC pregnancy negative  Check pelvic US to verify placement of IUD and eval for abnormalities  - POC URINALYSIS; Standing  - POCT Pregnancy  - POC URINALYSIS  - US-PELVIC COMPLETE (TRANSABDOMINAL/TRANSVAGINAL) (COMBO); Future    2. Vaginal bleeding  Unstable  Check US  - US-PELVIC COMPLETE (TRANSABDOMINAL/TRANSVAGINAL) (COMBO); Future    3. IUD (intrauterine device) in place  As above  - US-PELVIC COMPLETE (TRANSABDOMINAL/TRANSVAGINAL) (COMBO); Future      Followup: Return if symptoms worsen or fail to improve.    I have placed the below orders and discussed them with an approved delegating provider. The MA is performing the below orders under the direction of Dr. Ojeda

## 2019-11-21 ENCOUNTER — HOSPITAL ENCOUNTER (OUTPATIENT)
Facility: MEDICAL CENTER | Age: 34
End: 2019-11-21
Attending: NURSE PRACTITIONER
Payer: COMMERCIAL

## 2019-11-21 ENCOUNTER — OFFICE VISIT (OUTPATIENT)
Dept: MEDICAL GROUP | Facility: MEDICAL CENTER | Age: 34
End: 2019-11-21
Payer: COMMERCIAL

## 2019-11-21 VITALS
WEIGHT: 130 LBS | SYSTOLIC BLOOD PRESSURE: 102 MMHG | HEIGHT: 60 IN | OXYGEN SATURATION: 98 % | BODY MASS INDEX: 25.52 KG/M2 | DIASTOLIC BLOOD PRESSURE: 68 MMHG | TEMPERATURE: 98.2 F | HEART RATE: 67 BPM

## 2019-11-21 DIAGNOSIS — N30.01 ACUTE CYSTITIS WITH HEMATURIA: ICD-10-CM

## 2019-11-21 DIAGNOSIS — R30.0 DYSURIA: ICD-10-CM

## 2019-11-21 PROCEDURE — 87077 CULTURE AEROBIC IDENTIFY: CPT

## 2019-11-21 PROCEDURE — 99214 OFFICE O/P EST MOD 30 MIN: CPT | Performed by: NURSE PRACTITIONER

## 2019-11-21 PROCEDURE — 87086 URINE CULTURE/COLONY COUNT: CPT

## 2019-11-21 PROCEDURE — 87186 SC STD MICRODIL/AGAR DIL: CPT

## 2019-11-21 RX ORDER — SULFAMETHOXAZOLE AND TRIMETHOPRIM 800; 160 MG/1; MG/1
1 TABLET ORAL 2 TIMES DAILY
Qty: 6 TAB | Refills: 0 | Status: SHIPPED | OUTPATIENT
Start: 2019-11-21 | End: 2019-11-24

## 2019-11-21 ASSESSMENT — PATIENT HEALTH QUESTIONNAIRE - PHQ9: CLINICAL INTERPRETATION OF PHQ2 SCORE: 0

## 2019-11-22 NOTE — ASSESSMENT & PLAN NOTE
Symptoms started today, felt fine this morning, then symptoms started this afternoon with painful urination, urinary frequency, hesitancy/urgency, blood in urine, lower pelvic pain. Has had a UTI in the past but not this bad. Has not increased water intake, not taking anything OTC. Does have some body aches, no fevers or chills. Has had some lower back pain bilaterally for the past few days which is unusual for her. No nausea/vomiting.

## 2019-11-22 NOTE — PROGRESS NOTES
Subjective:   Pauline Perrin is a 33 y.o. female here today for dysuria:    Dysuria  Symptoms started today, felt fine this morning, then symptoms started this afternoon with painful urination, urinary frequency, hesitancy/urgency, blood in urine, lower pelvic pain. Has had a UTI in the past but not this bad. Has not increased water intake, not taking anything OTC. Does have some body aches, no fevers or chills. Has had some lower back pain bilaterally for the past few days which is unusual for her. No nausea/vomiting.          Current medicines (including changes today)  Current Outpatient Medications   Medication Sig Dispense Refill   • sulfamethoxazole-trimethoprim (BACTRIM DS) 800-160 MG tablet Take 1 Tab by mouth 2 times a day for 3 days. 6 Tab 0   • terbinafine (LAMISIL) 250 MG Tab Take 1 Tab by mouth every day. (Patient not taking: Reported on 11/21/2019) 30 Tab 2   • ciclopirox (PENLAC) 8 % solution 1 application to affected nail QHS (Patient not taking: Reported on 11/21/2019) 6.6 mL 3   • Probiotic Product (PROBIOTIC DAILY) Cap Take  by mouth.       No current facility-administered medications for this visit.      She  has a past medical history of Allergic reaction (8/17/2015), ASTHMA, Migraine (8/16/2018), and Mild intermittent asthma without complication (8/16/2018). She also has no past medical history of Allergy, Headache(784.0), Migraine, or Ulcer.    ROS  No chest pain, no shortness of breath, no abdominal pain  Positive ROS as per HPI.  All other systems reviewed and are negative.     Objective:     /68 (BP Location: Right arm, Patient Position: Sitting, BP Cuff Size: Adult)   Pulse 67   Temp 36.8 °C (98.2 °F) (Temporal)   Ht 1.524 m (5')   Wt 59 kg (130 lb)   SpO2 98%  Body mass index is 25.39 kg/m².     Physical Exam:  Constitutional: Alert, no distress.  Skin: Warm, dry, good turgor, no rashes in visible areas.  Eye: Equal, round, conjunctiva clear, lids normal.  ENMT: Lips  without lesions, good dentition  Neck: Trachea midline  Respiratory: Unlabored respiratory effort, lungs clear to auscultation, no wheezes, no ronchi.  Cardiovascular: Normal S1, S2, no murmur, no edema.  Abdomen: Soft, + pelvic tenderness, no masses, no hepatosplenomegaly. + mild CVA tenderness  Psych: Alert and oriented x3, normal affect and mood.      Assessment and Plan:   The following treatment plan was discussed    1. Dysuria  Unstable  POC UA-Pos Leuks and large blood  Check Culture  - URINE CULTURE(NEW); Future    2. Acute cystitis with hematuria  Unstable  Bactrim DS BID x 3 days  - sulfamethoxazole-trimethoprim (BACTRIM DS) 800-160 MG tablet; Take 1 Tab by mouth 2 times a day for 3 days.  Dispense: 6 Tab; Refill: 0    Strict ER precautions given    Followup: Return if symptoms worsen or fail to improve.    I have placed the below orders and discussed them with an approved delegating provider. The MA is performing the below orders under the direction of Dr. Ojeda

## 2019-11-24 LAB
BACTERIA UR CULT: ABNORMAL
BACTERIA UR CULT: ABNORMAL
SIGNIFICANT IND 70042: ABNORMAL
SITE SITE: ABNORMAL
SOURCE SOURCE: ABNORMAL

## 2019-11-26 ENCOUNTER — HOSPITAL ENCOUNTER (OUTPATIENT)
Dept: RADIOLOGY | Facility: MEDICAL CENTER | Age: 34
End: 2019-11-26
Attending: NURSE PRACTITIONER
Payer: COMMERCIAL

## 2019-11-26 DIAGNOSIS — Z97.5 IUD (INTRAUTERINE DEVICE) IN PLACE: ICD-10-CM

## 2019-11-26 DIAGNOSIS — N93.9 VAGINAL BLEEDING: ICD-10-CM

## 2019-11-26 DIAGNOSIS — R10.30 LOWER ABDOMINAL PAIN: ICD-10-CM

## 2019-11-26 PROCEDURE — 76830 TRANSVAGINAL US NON-OB: CPT

## 2019-12-03 ENCOUNTER — TELEPHONE (OUTPATIENT)
Dept: MEDICAL GROUP | Facility: MEDICAL CENTER | Age: 34
End: 2019-12-03

## 2019-12-03 NOTE — TELEPHONE ENCOUNTER
----- Message from KENIA Sierra sent at 12/1/2019 10:21 PM PST -----  Please notify patient that her urine was positive for infection and that the antibiotic should have taken care of it.     KENIA Sierra

## 2019-12-04 ENCOUNTER — TELEPHONE (OUTPATIENT)
Dept: MEDICAL GROUP | Facility: MEDICAL CENTER | Age: 34
End: 2019-12-04

## 2019-12-04 NOTE — TELEPHONE ENCOUNTER
----- Message from KENIA Sierra sent at 12/3/2019  9:06 PM PST -----  Please notify patient that her abdominal US showed normal placement of her IUD, but did show a moderately sized ovarian cyst which may be causing her pain. It does not explain her vaginal bleeding, but this may be due to cycle abnormalities. She may want to schedule an appointment with Gynecology to discuss these findings and look into treatment options.     KENIA Sierra

## 2020-01-02 ENCOUNTER — OFFICE VISIT (OUTPATIENT)
Dept: MEDICAL GROUP | Facility: MEDICAL CENTER | Age: 35
End: 2020-01-02
Payer: COMMERCIAL

## 2020-01-02 VITALS
SYSTOLIC BLOOD PRESSURE: 100 MMHG | WEIGHT: 135 LBS | HEART RATE: 81 BPM | BODY MASS INDEX: 26.5 KG/M2 | DIASTOLIC BLOOD PRESSURE: 62 MMHG | HEIGHT: 60 IN | OXYGEN SATURATION: 98 % | TEMPERATURE: 98.2 F | RESPIRATION RATE: 16 BRPM

## 2020-01-02 DIAGNOSIS — L02.214 ABSCESS OF LEFT GROIN: ICD-10-CM

## 2020-01-02 DIAGNOSIS — R21 RASH: ICD-10-CM

## 2020-01-02 DIAGNOSIS — J45.20 MILD INTERMITTENT ASTHMA WITHOUT COMPLICATION: ICD-10-CM

## 2020-01-02 PROBLEM — L73.1 INGROWN HAIR: Status: ACTIVE | Noted: 2020-01-02

## 2020-01-02 PROCEDURE — 99214 OFFICE O/P EST MOD 30 MIN: CPT | Performed by: NURSE PRACTITIONER

## 2020-01-02 RX ORDER — ALBUTEROL SULFATE 90 UG/1
2 AEROSOL, METERED RESPIRATORY (INHALATION) EVERY 6 HOURS PRN
Qty: 8.5 G | Refills: 3 | Status: SHIPPED | OUTPATIENT
Start: 2020-01-02 | End: 2020-11-18

## 2020-01-02 RX ORDER — CLOTRIMAZOLE AND BETAMETHASONE DIPROPIONATE 10; .64 MG/G; MG/G
1 CREAM TOPICAL 2 TIMES DAILY
Qty: 1 TUBE | Refills: 0 | Status: SHIPPED | OUTPATIENT
Start: 2020-01-02 | End: 2020-06-25

## 2020-01-02 RX ORDER — SULFAMETHOXAZOLE AND TRIMETHOPRIM 800; 160 MG/1; MG/1
1 TABLET ORAL 2 TIMES DAILY
Qty: 14 TAB | Refills: 0 | Status: SHIPPED | OUTPATIENT
Start: 2020-01-02 | End: 2020-01-09

## 2020-01-02 ASSESSMENT — PATIENT HEALTH QUESTIONNAIRE - PHQ9: CLINICAL INTERPRETATION OF PHQ2 SCORE: 0

## 2020-01-02 NOTE — ASSESSMENT & PLAN NOTE
Chronic. Ongoing since she was around age 18. Mainly exercise induced. Has been using albuterol as needed prior to exercise. She has not needed this often, not worsening per patient. She will be starting soccer soon and would like a refill to use prior and as needed with exercise.

## 2020-01-02 NOTE — ASSESSMENT & PLAN NOTE
Started about 1 month ago, around the left side of nose. Thinks it may have been caused by accidentally touching her face to the mat at the gym. Described as small bumps and itching. It is starting to spread up the side of her nose. Has been applying Vicks, neosporin, lotion without improvement.

## 2020-01-02 NOTE — ASSESSMENT & PLAN NOTE
Started for about 1 week, started out looking like a small pimple on the left side of her thigh/groin. Has gotten bigger and harder, painful to touch and when rubbing against clothes.     Denies significant redness, fevers, chills, body aches, nausea/vomiting, drainage.

## 2020-01-02 NOTE — PROGRESS NOTES
Subjective:   Pauline Perrin is a 34 y.o. female here today for the following concerns:    Mild intermittent asthma without complication  Chronic. Ongoing since she was around age 18. Mainly exercise induced. Has been using albuterol as needed prior to exercise. She has not needed this often, not worsening per patient. She will be starting soccer soon and would like a refill to use prior and as needed with exercise.     Rash  Started about 1 month ago, around the left side of nose. Thinks it may have been caused by accidentally touching her face to the mat at the gym. Described as small bumps and itching. It is starting to spread up the side of her nose. Has been applying Vicks, neosporin, lotion without improvement.     Abscess of left groin  Started for about 1 week, started out looking like a small pimple on the left side of her thigh/groin. Has gotten bigger and harder, painful to touch and when rubbing against clothes.     Denies significant redness, fevers, chills, body aches, nausea/vomiting, drainage.        Current medicines (including changes today)  Current Outpatient Medications   Medication Sig Dispense Refill   • albuterol 108 (90 Base) MCG/ACT Aero Soln inhalation aerosol Inhale 2 Puffs by mouth every 6 hours as needed for Shortness of Breath. 8.5 g 3   • clotrimazole-betamethasone (LOTRISONE) 1-0.05 % Cream Apply 1 Application to affected area(s) 2 times a day. Apply thin layer very sparingly 1 Tube 0   • sulfamethoxazole-trimethoprim (BACTRIM DS) 800-160 MG tablet Take 1 Tab by mouth 2 times a day for 7 days. 14 Tab 0   • Probiotic Product (PROBIOTIC DAILY) Cap Take  by mouth.       No current facility-administered medications for this visit.      She  has a past medical history of Allergic reaction (8/17/2015), ASTHMA, Migraine (8/16/2018), and Mild intermittent asthma without complication (8/16/2018). She also has no past medical history of Allergy, Headache(784.0), Migraine, or  Ulcer.    ROS   No chest pain, no shortness of breath, no abdominal pain  Positive ROS as per HPI.  All other systems reviewed and are negative.     Objective:     /62 (BP Location: Right arm, Patient Position: Sitting, BP Cuff Size: Adult)   Pulse 81   Temp 36.8 °C (98.2 °F) (Temporal)   Resp 16   Ht 1.524 m (5')   Wt 61.2 kg (135 lb)   SpO2 98%  Body mass index is 26.37 kg/m².     Physical Exam:  Constitutional: Alert, no distress.  Skin: Warm, dry, good turgor, no rashes in visible areas. + Slightly erythematous area on left side of nose with very small bumps present. + 2-1/2 x 1-1/2 cm indurated, raised bump on left groin/inner thigh.  No fluctuant area noted, slight discoloration of skin (slightly erythematous/purple), no drainage.  Eye: Equal, round, conjunctiva clear, lids normal.  ENMT: Lips without lesions, good dentition  Neck: Trachea midline  Respiratory: Unlabored respiratory effort  Cardiovascular: No edema.  Psych: Alert and oriented x3, normal affect and mood.      Assessment and Plan:   The following treatment plan was discussed    1. Mild intermittent asthma without complication  Stable  Albuterol refilled  - albuterol 108 (90 Base) MCG/ACT Aero Soln inhalation aerosol; Inhale 2 Puffs by mouth every 6 hours as needed for Shortness of Breath.  Dispense: 8.5 g; Refill: 3    2. Rash  Unstable  Possibly fungal rash versus dermatitis  Trial of Lotrisone cream twice daily  - clotrimazole-betamethasone (LOTRISONE) 1-0.05 % Cream; Apply 1 Application to affected area(s) 2 times a day. Apply thin layer very sparingly  Dispense: 1 Tube; Refill: 0    3. Abscess of left groin  Unstable  Difficult to tell if area is fluctuant, there is no head or drainage noted.  Patient is not having any systemic symptoms of infection, area is indurated, but no significant redness is noted.  Will treat with Bactrim twice daily for 7 days and have patient return in 10 days to recheck the area.  If this resolves, she  can cancel the appointment.  She also sees her gynecologist on 1/14 who can also reevaluate the area.  If symptoms worsen or fail to improve by 10 days, may need to do an I&D in the office.  - sulfamethoxazole-trimethoprim (BACTRIM DS) 800-160 MG tablet; Take 1 Tab by mouth 2 times a day for 7 days.  Dispense: 14 Tab; Refill: 0      Followup: Return in about 10 days (around 1/12/2020).    I have placed the below orders and discussed them with an approved delegating provider. The MA is performing the below orders under the direction of Dr. Ojeda

## 2020-01-13 ENCOUNTER — OFFICE VISIT (OUTPATIENT)
Dept: MEDICAL GROUP | Facility: MEDICAL CENTER | Age: 35
End: 2020-01-13
Payer: COMMERCIAL

## 2020-01-13 VITALS
HEIGHT: 60 IN | BODY MASS INDEX: 25.72 KG/M2 | TEMPERATURE: 97.6 F | SYSTOLIC BLOOD PRESSURE: 112 MMHG | OXYGEN SATURATION: 94 % | WEIGHT: 131 LBS | HEART RATE: 73 BPM | DIASTOLIC BLOOD PRESSURE: 72 MMHG

## 2020-01-13 DIAGNOSIS — L02.214 ABSCESS OF LEFT GROIN: ICD-10-CM

## 2020-01-13 PROCEDURE — 99213 OFFICE O/P EST LOW 20 MIN: CPT | Performed by: NURSE PRACTITIONER

## 2020-01-13 NOTE — PROGRESS NOTES
Subjective:   Pauline Perrin is a 34 y.o. female here today for abscess recheck:    Abscess of left groin  Patient here for abscess recheck. Pain and swelling has resolved with Bactrim x 7 days. Still had a hard area. Has not been doing warm compresses.     Denies fevers, chills, body aches, nausea/vomiting, drainage.        Current medicines (including changes today)  Current Outpatient Medications   Medication Sig Dispense Refill   • albuterol 108 (90 Base) MCG/ACT Aero Soln inhalation aerosol Inhale 2 Puffs by mouth every 6 hours as needed for Shortness of Breath. 8.5 g 3   • clotrimazole-betamethasone (LOTRISONE) 1-0.05 % Cream Apply 1 Application to affected area(s) 2 times a day. Apply thin layer very sparingly 1 Tube 0   • Probiotic Product (PROBIOTIC DAILY) Cap Take  by mouth.       No current facility-administered medications for this visit.      She  has a past medical history of Allergic reaction (8/17/2015), ASTHMA, Migraine (8/16/2018), and Mild intermittent asthma without complication (8/16/2018). She also has no past medical history of Allergy, Headache(784.0), Migraine, or Ulcer.    ROS  No chest pain, no shortness of breath, no abdominal pain  Positive ROS as per HPI.  All other systems reviewed and are negative.     Objective:     /72 (BP Location: Right arm, Patient Position: Sitting)   Pulse 73   Temp 36.4 °C (97.6 °F) (Temporal)   Ht 1.524 m (5')   Wt 59.4 kg (131 lb)   SpO2 94%  Body mass index is 25.58 kg/m².     Physical Exam:  Constitutional: Alert, no distress.  Skin: Warm, dry, good turgor, no rashes in visible areas. + hard area on left groin, no erythema, drainage, redness noted.   Eye: Equal, round, conjunctiva clear, lids normal.  ENMT: Lips without lesions, good dentition  Neck: Trachea midline  Respiratory: Unlabored respiratory effort  Cardiovascular: No edema.  Psych: Alert and oriented x3, normal affect and mood.      Assessment and Plan:   The following  treatment plan was discussed    1. Abscess of left groin  Stable  Symptoms mostly resolved aside from slightly firm area, possible an inflamed lymph node.   Follow up with GYN tomorrow, return to clinic if symptoms worsen.     Followup: Return if symptoms worsen or fail to improve.    I have placed the below orders and discussed them with an approved delegating provider. The MA is performing the below orders under the direction of Dr. Ojeda

## 2020-01-13 NOTE — ASSESSMENT & PLAN NOTE
Patient here for abscess recheck. Pain and swelling has resolved with Bactrim x 7 days. Still had a hard area. Has not been doing warm compresses.     Denies fevers, chills, body aches, nausea/vomiting, drainage.

## 2020-01-14 ENCOUNTER — HOSPITAL ENCOUNTER (OUTPATIENT)
Facility: MEDICAL CENTER | Age: 35
End: 2020-01-14
Attending: OBSTETRICS & GYNECOLOGY
Payer: COMMERCIAL

## 2020-01-14 ENCOUNTER — GYNECOLOGY VISIT (OUTPATIENT)
Dept: OBGYN | Facility: CLINIC | Age: 35
End: 2020-01-14
Payer: COMMERCIAL

## 2020-01-14 VITALS — DIASTOLIC BLOOD PRESSURE: 73 MMHG | WEIGHT: 133 LBS | BODY MASS INDEX: 25.97 KG/M2 | SYSTOLIC BLOOD PRESSURE: 113 MMHG

## 2020-01-14 DIAGNOSIS — N83.201 RIGHT OVARIAN CYST: ICD-10-CM

## 2020-01-14 DIAGNOSIS — Z12.4 CERVICAL CANCER SCREENING: ICD-10-CM

## 2020-01-14 DIAGNOSIS — N93.9 ABNORMAL UTERINE BLEEDING (AUB): ICD-10-CM

## 2020-01-14 LAB — AMBIGUOUS DTTM AMBI4: NORMAL

## 2020-01-14 PROCEDURE — 99214 OFFICE O/P EST MOD 30 MIN: CPT | Performed by: OBSTETRICS & GYNECOLOGY

## 2020-01-14 PROCEDURE — 88175 CYTOPATH C/V AUTO FLUID REDO: CPT

## 2020-01-14 PROCEDURE — 87624 HPV HI-RISK TYP POOLED RSLT: CPT

## 2020-01-14 RX ORDER — ACETAMINOPHEN AND CODEINE PHOSPHATE 120; 12 MG/5ML; MG/5ML
1 SOLUTION ORAL DAILY
Qty: 28 TAB | Refills: 11 | Status: SHIPPED | OUTPATIENT
Start: 2020-01-14 | End: 2020-11-18

## 2020-01-14 NOTE — PROGRESS NOTES
GYN Consult    CC/reason for consult: pain with intercourse    HPI: Pauline Perrin is a 34 y.o.  with c/o pain with intercourse. Patient was seen by PCP who ordered an ultrasound which demonstrated a 5 cm simple cyst on her right ovary. She also endorses irregular cycles but has Mirena IUD. Pain started in September. Admits to nausea associated with her pain. Pain comes and goes but is constant during intercourse.       ROS:  constitutional: denies fevers, general concerns  CV: denies chest pain, palpitations, edema  Resp: denies shortness of breath, cough  GI: denies abd pain, N/V, diarrhea/constipation, blood in stool  : denies irregular vaginal bleeding, discharge, pain, denies urinary complaints  Neuro: admits to history of migraines with OCP use in the past   Endo: denies significant weight changes, irregular menses, temperature intolerance, denies hotflashes/nightsweats  Heme/lymph: denies easy bleeding/bruising, denies swollen glands  Psych: denies concerns about mood, denies SI  Allergy: denies concerns        GYN History:  Cycles irregular- has Mirena. Menarche @15.  Last pap - wants it done today. No h/o abnormal pap, nor history of cone biopsy, LEEP or any other cervical, uterine or gynecologic surgery. No history of sexually transmitted diseases.       OB history:  OB History    Para Term  AB Living   2 2 2     2   SAB TAB Ectopic Molar Multiple Live Births             2      # Outcome Date GA Lbr Gaurav/2nd Weight Sex Delivery Anes PTL Lv   2 Term 13 38w4d  3.26 kg (7 lb 3 oz) M Vag-Spont EPI  DANIEL   1 Term 08 40w0d  2.92 kg (6 lb 7 oz) F Vag-Spont EPI  DANIEL       Past Medical History:   Diagnosis Date   • Allergic reaction 2015   • ASTHMA     has a inhailer   • Migraine 2018   • Mild intermittent asthma without complication 2018       History reviewed. No pertinent surgical history.    Medications:   Current Outpatient Medications Ordered in  Epic   Medication Sig Dispense Refill   • albuterol 108 (90 Base) MCG/ACT Aero Soln inhalation aerosol Inhale 2 Puffs by mouth every 6 hours as needed for Shortness of Breath. 8.5 g 3   • clotrimazole-betamethasone (LOTRISONE) 1-0.05 % Cream Apply 1 Application to affected area(s) 2 times a day. Apply thin layer very sparingly 1 Tube 0   • Probiotic Product (PROBIOTIC DAILY) Cap Take  by mouth.       No current Epic-ordered facility-administered medications on file.        Allergies: Nkda [no known drug allergy]    Social History     Socioeconomic History   • Marital status:      Spouse name: Not on file   • Number of children: Not on file   • Years of education: Not on file   • Highest education level: Not on file   Occupational History   • Not on file   Social Needs   • Financial resource strain: Not on file   • Food insecurity:     Worry: Not on file     Inability: Not on file   • Transportation needs:     Medical: Not on file     Non-medical: Not on file   Tobacco Use   • Smoking status: Never Smoker   • Smokeless tobacco: Never Used   Substance and Sexual Activity   • Alcohol use: Yes     Comment: 1/week   • Drug use: No   • Sexual activity: Yes     Partners: Male   Lifestyle   • Physical activity:     Days per week: Not on file     Minutes per session: Not on file   • Stress: Not on file   Relationships   • Social connections:     Talks on phone: Not on file     Gets together: Not on file     Attends Alevism service: Not on file     Active member of club or organization: Not on file     Attends meetings of clubs or organizations: Not on file     Relationship status: Not on file   • Intimate partner violence:     Fear of current or ex partner: Not on file     Emotionally abused: Not on file     Physically abused: Not on file     Forced sexual activity: Not on file   Other Topics Concern   • Not on file   Social History Narrative   • Not on file       Family History   Problem Relation Age of Onset   •  Hypertension Mother    • Hypertension Father    • Arthritis Maternal Grandmother    • Autoimmune Disease Sister 18        lupus   • Stroke Brother 31   • No Known Problems Brother    • Diabetes Maternal Uncle    • Diabetes Paternal Aunt      Denies hx of GI/GYN/breast cancers    Physical Exam:  /73 (BP Location: Right arm, Patient Position: Sitting)   Wt 60.3 kg (133 lb)   Breastfeeding? No   BMI 25.97 kg/m²   gen: AAO, NAD, affect appropriate  CV: RRR; no LE edema  Resp: Symmetric non labored breathing, CTAB  Abd: soft, NT, ND, no masses, no organomegaly, no hernias  : NEFG, normal urethral meatus, normal anus/perineum, normal vagina and cervix.  Uterus midline, anteverted, right sided mobile tender adnexal mass palpated, nothing on the left side  Skin: warm/dry, no lesions    A/P: 34 y.o.  with   1. Right ovarian cyst  US-PELVIC COMPLETE (TRANSABDOMINAL/TRANSVAGINAL) (COMBO)     Will repeat US to assess stability of cyst size. Started on micronor as she has migraines with OCP. Discussed risk of torsion and rupture with a 5 cm cyst. Patient understands and wants to try medical management before moving towards surgical management. FU in one month to discuss ultrasound results.

## 2020-01-14 NOTE — NON-PROVIDER
Pt states that she has had pain with intercourse since November 2019. Pt had an ultrasound 11/26/2019. Pt states that she has a bump on her inner thigh, pt states that she went to see her PCP and was given antibiotics  Good # [phone]   Pharmacy verified.

## 2020-01-28 ENCOUNTER — HOSPITAL ENCOUNTER (OUTPATIENT)
Dept: RADIOLOGY | Facility: MEDICAL CENTER | Age: 35
End: 2020-01-28
Attending: OBSTETRICS & GYNECOLOGY
Payer: COMMERCIAL

## 2020-01-28 DIAGNOSIS — N83.201 RIGHT OVARIAN CYST: ICD-10-CM

## 2020-01-28 PROCEDURE — 76830 TRANSVAGINAL US NON-OB: CPT

## 2020-02-11 ENCOUNTER — GYNECOLOGY VISIT (OUTPATIENT)
Dept: OBGYN | Facility: CLINIC | Age: 35
End: 2020-02-11
Payer: COMMERCIAL

## 2020-02-11 VITALS
WEIGHT: 131 LBS | BODY MASS INDEX: 25.72 KG/M2 | DIASTOLIC BLOOD PRESSURE: 67 MMHG | SYSTOLIC BLOOD PRESSURE: 118 MMHG | HEART RATE: 68 BPM | HEIGHT: 60 IN

## 2020-02-11 DIAGNOSIS — N83.201 BILATERAL OVARIAN CYSTS: ICD-10-CM

## 2020-02-11 DIAGNOSIS — N83.202 BILATERAL OVARIAN CYSTS: ICD-10-CM

## 2020-02-11 PROCEDURE — 99214 OFFICE O/P EST MOD 30 MIN: CPT | Performed by: OBSTETRICS & GYNECOLOGY

## 2020-02-11 NOTE — PROGRESS NOTES
GYN Visit  CC: ovarian cyst     Pauline Perrin is a 34 y.o.  who presents today for follow up of ovarian cyst.   Patient reports that she has been symptomatic from the cyst in the past several months.  She experiences a sharp intermittent pain on most days.  Often worse with laying down.  It is in the right lower quadrant and only lasts for about a second and then goes away on its own.  She also experiences a similar pain during intercourse which is improved with adjustment of position.  Occasionally has some achiness with intercourse located in the same location.  Continues to have irregular spotting with IUD in place but no other significant vaginal bleeding.  Denies any abnormal vaginal discharge or other concerns today.    FINDINGS:  Both transabdominal and transvaginal scanning were performed to optimally visualize the pelvis.     The uterus measures 4.13 cm x 7.42 cm x 5.33 cm.  The uterine myometrium is within normal limits.  The endometrial echo complex measures 0.23 cm.  IUD is centrally located in satisfactory position.  Low resistive waveforms are confirmed within the ovaries.  The right ovary measures 6.00 cm x 6.17 cm x 4.47 cm.  The left ovary measures 4.75 cm x 3.36 cm x 2.23 cm.  The right ovarian cyst now has an internal daughter cyst. The entire cystic area measures 5.9 x 4.5 x 5.6 cm, (previously 5.1 x 4.1 x 3.0 cm). There is no solid component or suspicious vascularity. There is now a left-sided ovarian cyst measuring 2.9 x   2.1 x 1.8 cm with no internal echoes. The wall is slightly thickened.  There is no free fluid seen.  IMPRESSION:  1.  There has been slight interval increase in size of the right ovarian cyst, now with an internal daughter cyst but no suspicious solid components or abnormal vascularity.  2.  There is a incidental left ovarian cyst.    GYN History:  No LMP recorded. Patient has had an implant..  No menses - has Mirena.  Last pap 20,  no h/o abnormal pap.   No history of cone biopsy, LEEP or any other cervical, uterine or gynecologic surgery. No history of sexually transmitted diseases.  Currently sexually active with one male partner.  Utilizing Mirnea for contraception and has used OCPs in the past.     OB History:    OB History    Para Term  AB Living   2 2 2     2   SAB TAB Ectopic Molar Multiple Live Births             2      # Outcome Date GA Lbr Gaurav/2nd Weight Sex Delivery Anes PTL Lv   2 Term 13 38w4d  3.26 kg (7 lb 3 oz) M Vag-Spont EPI  DANIEL   1 Term 08 40w0d  2.92 kg (6 lb 7 oz) F Vag-Spont EPI  DANIEL       Review of Systems:   Pertinent positives documented in HPI and all other systems reviewed & are negative.    All PMH, PSH, allergies, social history and FH reviewed and updated today:  Past Medical History:  Past Medical History:   Diagnosis Date   • Allergic reaction 2015   • ASTHMA     has a inhailer   • Migraine 2018   • Mild intermittent asthma without complication 2018       Past Surgical History:  No past surgical history on file.    Medications:   Current Outpatient Medications Ordered in Epic   Medication Sig Dispense Refill   • norethindrone (MICRONOR) 0.35 MG tablet Take 1 Tab by mouth every day. 28 Tab 11   • albuterol 108 (90 Base) MCG/ACT Aero Soln inhalation aerosol Inhale 2 Puffs by mouth every 6 hours as needed for Shortness of Breath. 8.5 g 3   • clotrimazole-betamethasone (LOTRISONE) 1-0.05 % Cream Apply 1 Application to affected area(s) 2 times a day. Apply thin layer very sparingly 1 Tube 0   • Probiotic Product (PROBIOTIC DAILY) Cap Take  by mouth.       No current Epic-ordered facility-administered medications on file.        Allergies: Nkda [no known drug allergy]    Social History:  Social History     Socioeconomic History   • Marital status:      Spouse name: Not on file   • Number of children: Not on file   • Years of education: Not on file   • Highest education level: Not on file    Occupational History   • Not on file   Social Needs   • Financial resource strain: Not on file   • Food insecurity:     Worry: Not on file     Inability: Not on file   • Transportation needs:     Medical: Not on file     Non-medical: Not on file   Tobacco Use   • Smoking status: Never Smoker   • Smokeless tobacco: Never Used   Substance and Sexual Activity   • Alcohol use: Yes     Comment: 1/week   • Drug use: No   • Sexual activity: Yes     Partners: Male   Lifestyle   • Physical activity:     Days per week: Not on file     Minutes per session: Not on file   • Stress: Not on file   Relationships   • Social connections:     Talks on phone: Not on file     Gets together: Not on file     Attends Yazdanism service: Not on file     Active member of club or organization: Not on file     Attends meetings of clubs or organizations: Not on file     Relationship status: Not on file   • Intimate partner violence:     Fear of current or ex partner: Not on file     Emotionally abused: Not on file     Physically abused: Not on file     Forced sexual activity: Not on file   Other Topics Concern   • Not on file   Social History Narrative   • Not on file       Family History:  Family History   Problem Relation Age of Onset   • Hypertension Mother    • Hypertension Father    • Arthritis Maternal Grandmother    • Autoimmune Disease Sister 18        lupus   • Stroke Brother 31   • No Known Problems Brother    • Diabetes Maternal Uncle    • Diabetes Paternal Aunt            Objective:   Vitals:  /67   Pulse 68   Ht 1.524 m (5')   Wt 59.4 kg (131 lb)   Body mass index is 25.58 kg/m². (Goal BM I>18 <25)    Physical Exam:   Nursing note and vitals reviewed.  GENERAL: No acute distress  HENT: Atraumatic, normocephalic  EYES: Extraocular movements intact, pupils equal and reactive to light  NECK: Supple, Full ROM  CHEST: No deformities, Equal chest expansion  RESP: Unlabored, no stridor or audible wheeze  ABD: Soft, Nontender,  Non-Distended  Extremities: No Clubbing, Cyanosis, or Edema  Skin: Warm/dry, without rases  Neuro: A/O x 4, CN 2-12 Grossly intact, Motor/sensory grossly intact  Psych: Normal mood, behavior, and affect     Assessment/Plan:   Pauline Perrin is a 34 y.o.  female who presents for:    1. Bilateral ovarian cysts  REFERRAL TO OB/GYN SURGERY     #Bilateral ovarian cyst.  Right ovarian cyst for which the ultrasound was repeated has increased in size and is symptomatic.  Previously 5 x 4 x 3 now 5 x 4 x 5 with internal daughter cyst.  No sonographic evidence of malignancy.  Discussed options for management considering increasing size with patient today.  Interested in surgical management.  Small cyst also seen on left ovary which may resolve on its own however if is present at the time of surgery will plan to perform bilateral ovarian cystectomy.  Referral for prior authorization for laparoscopic ovarian cystectomy, possible bilateral with possible oophorectomy and other indicated procedures has been requested today.  Surgery as discussed with patient and her questions are addressed.  Precautions regarding cyst rupture and torsion are reviewed with patient today.  Understands she will be called for preoperative appointment and surgical planning.  Will need repeat pelvic at time of pre op appt.  #Contraception.  IUD in place  #Follow up.  RTC for pre op or sooner if concerns or questions arise.    Patient was seen for 25 minutes of which > 50% of appointment time was spent on face-to-face counseling and coordination of care regarding the above.

## 2020-03-04 DIAGNOSIS — R10.30 LOWER ABDOMINAL PAIN: ICD-10-CM

## 2020-03-04 DIAGNOSIS — N83.201 CYSTS OF BOTH OVARIES: ICD-10-CM

## 2020-03-04 DIAGNOSIS — N83.202 CYSTS OF BOTH OVARIES: ICD-10-CM

## 2020-06-03 ENCOUNTER — GYNECOLOGY VISIT (OUTPATIENT)
Dept: OBGYN | Facility: CLINIC | Age: 35
End: 2020-06-03
Payer: COMMERCIAL

## 2020-06-03 VITALS — WEIGHT: 136 LBS | BODY MASS INDEX: 26.56 KG/M2 | DIASTOLIC BLOOD PRESSURE: 73 MMHG | SYSTOLIC BLOOD PRESSURE: 109 MMHG

## 2020-06-03 DIAGNOSIS — N83.201 CYST OF RIGHT OVARY: ICD-10-CM

## 2020-06-03 DIAGNOSIS — Z97.5 PRESENCE OF INTRAUTERINE CONTRACEPTIVE DEVICE: ICD-10-CM

## 2020-06-03 PROCEDURE — 99214 OFFICE O/P EST MOD 30 MIN: CPT | Performed by: OBSTETRICS & GYNECOLOGY

## 2020-06-03 PROCEDURE — 76705 ECHO EXAM OF ABDOMEN: CPT | Performed by: OBSTETRICS & GYNECOLOGY

## 2020-06-03 ASSESSMENT — FIBROSIS 4 INDEX: FIB4 SCORE: 0.69

## 2020-06-03 NOTE — PROGRESS NOTES
GYN Visit  CC: ovarian cyst     Pauline Perrin is a 34 y.o.  who presents today for preoperative visit prior to planned ovarian cystectomy.  Patient last had an ultrasound performed in January at which time right ovarian cyst measured 6 x 4.5 x 5.6 cm..   Patient reports that since being seen last she had an evening when her pain was significantly worse and since then she thinks that overall her pain has actually been slightly better.  Still feels intermittent sharp pains in her right lower quadrant.  Has an IUD in place and has been happy with that.  Minimal vaginal bleeding.    FINDINGS:  Both transabdominal and transvaginal scanning were performed to optimally visualize the pelvis.  The uterus measures 4.13 cm x 7.42 cm x 5.33 cm.  The uterine myometrium is within normal limits.  The endometrial echo complex measures 0.23 cm.  IUD is centrally located in satisfactory position.  Low resistive waveforms are confirmed within the ovaries.  The right ovary measures 6.00 cm x 6.17 cm x 4.47 cm.  The left ovary measures 4.75 cm x 3.36 cm x 2.23 cm.  The right ovarian cyst now has an internal daughter cyst. The entire cystic area measures 5.9 x 4.5 x 5.6 cm, (previously 5.1 x 4.1 x 3.0 cm). There is no solid component or suspicious vascularity. There is now a left-sided ovarian cyst measuring 2.9 x   2.1 x 1.8 cm with no internal echoes. The wall is slightly thickened.  There is no free fluid seen   IMPRESSION:  1.  There has been slight interval increase in size of the right ovarian cyst, now with an internal daughter cyst but no suspicious solid components or abnormal vascularity.  2.  There is a incidental left ovarian cyst.  GYN History:  No menses - has Mirena.  Last pap 20,  no h/o abnormal pap.  No history of cone biopsy, LEEP or any other cervical, uterine or gynecologic surgery. No history of sexually transmitted diseases.  Currently sexually active with one male partner.  Utilizing Mirnea  for contraception and has used OCPs in the past.     OB History:    OB History    Para Term  AB Living   2 2 2     2   SAB TAB Ectopic Molar Multiple Live Births             2      # Outcome Date GA Lbr Gaurav/2nd Weight Sex Delivery Anes PTL Lv   2 Term 13 38w4d  3.26 kg (7 lb 3 oz) M Vag-Spont EPI  DANIEL   1 Term 08 40w0d  2.92 kg (6 lb 7 oz) F Vag-Spont EPI  DANIEL       Review of Systems:   Pertinent positives documented in HPI and all other systems reviewed & are negative.    All PMH, PSH, allergies, social history and FH reviewed and updated today:  Past Medical History:  Past Medical History:   Diagnosis Date   • Allergic reaction 2015   • ASTHMA     has a inhailer   • Migraine 2018   • Mild intermittent asthma without complication 2018       Past Surgical History:  History reviewed. No pertinent surgical history.    Medications:   Current Outpatient Medications Ordered in Epic   Medication Sig Dispense Refill   • norethindrone (MICRONOR) 0.35 MG tablet Take 1 Tab by mouth every day. 28 Tab 11   • albuterol 108 (90 Base) MCG/ACT Aero Soln inhalation aerosol Inhale 2 Puffs by mouth every 6 hours as needed for Shortness of Breath. 8.5 g 3   • clotrimazole-betamethasone (LOTRISONE) 1-0.05 % Cream Apply 1 Application to affected area(s) 2 times a day. Apply thin layer very sparingly 1 Tube 0   • Probiotic Product (PROBIOTIC DAILY) Cap Take  by mouth.       No current Epic-ordered facility-administered medications on file.        Allergies: Nkda [no known drug allergy]    Social History:  Social History     Socioeconomic History   • Marital status:      Spouse name: Not on file   • Number of children: Not on file   • Years of education: Not on file   • Highest education level: Not on file   Occupational History   • Not on file   Social Needs   • Financial resource strain: Not on file   • Food insecurity     Worry: Not on file     Inability: Not on file   • Transportation  needs     Medical: Not on file     Non-medical: Not on file   Tobacco Use   • Smoking status: Never Smoker   • Smokeless tobacco: Never Used   Substance and Sexual Activity   • Alcohol use: Yes     Comment: 1/week   • Drug use: No   • Sexual activity: Yes     Partners: Male   Lifestyle   • Physical activity     Days per week: Not on file     Minutes per session: Not on file   • Stress: Not on file   Relationships   • Social connections     Talks on phone: Not on file     Gets together: Not on file     Attends Sikhism service: Not on file     Active member of club or organization: Not on file     Attends meetings of clubs or organizations: Not on file     Relationship status: Not on file   • Intimate partner violence     Fear of current or ex partner: Not on file     Emotionally abused: Not on file     Physically abused: Not on file     Forced sexual activity: Not on file   Other Topics Concern   • Not on file   Social History Narrative   • Not on file       Family History:  Family History   Problem Relation Age of Onset   • Hypertension Mother    • Hypertension Father    • Arthritis Maternal Grandmother    • Autoimmune Disease Sister 18        lupus   • Stroke Brother 31   • No Known Problems Brother    • Diabetes Maternal Uncle    • Diabetes Paternal Aunt            Objective:   Vitals:  /73   Wt 61.7 kg (136 lb)   Body mass index is 26.56 kg/m². (Goal BM I>18 <25)    Physical Exam:   Nursing note and vitals reviewed.  GENERAL: No acute distress  HENT: Atraumatic, normocephalic  EYES: Extraocular movements intact, pupils equal and reactive to light  NECK: Supple, Full ROM  CHEST: No deformities, Equal chest expansion  RESP: Unlabored, no stridor or audible wheeze  ABD: Soft, Nontender, Non-Distended  Extremities: No Clubbing, Cyanosis, or Edema  Skin: Warm/dry, without rases  Neuro: A/O x 4, CN 2-12 Grossly intact, Motor/sensory grossly intact  Psych: Normal mood, behavior, and affect    Bedside  US:  Transabdominal -performed by me and per my read  Indication: Follow-up adnexal mass   Bladder appears normal  Anteverted uterus with thin endometrial complex and IUD intracavitary.  LO not well seen  RO with multiple simple appearing cysts.  Largest measures 1.2x3cm with thin internal septation     Assessment/Plan:   Pauline Perrin is a 34 y.o.  female who presents for:    1. Cyst of right ovary  US-PELVIC COMPLETE (TRANSABDOMINAL/TRANSVAGINAL) (COMBO)   2. IUD (intrauterine device) in place  US-PELVIC COMPLETE (TRANSABDOMINAL/TRANSVAGINAL) (COMBO)     #Right ovarian cyst.  Unlimited bedside transabdominal ultrasound performed by me today cyst has decreased significantly in size.  Given this recommend repeat imaging instead of proceeding with surgical management.  Repeat formal ultrasound has been ordered.  Patient understands that pending these results and further surveillance she may need to have surgery in the future but do not feel it is most prudent to proceed with surgery at this time.  #Contraception.  Happy with Mirena IUD.  #Follow up.  RTC in about 1 month to follow-up ultrasound results or sooner if concerns or questions arise.    Patient was seen for 25 minutes of which > 50% of appointment time was spent on face-to-face counseling and coordination of care regarding the above.

## 2020-06-03 NOTE — NON-PROVIDER
Pt here for pre-op. Bilateral ovarian cystectomy, 6/10/2020.    Pt states no complaints   Good# [phone]  Pharmacy confirmed.

## 2020-06-25 ENCOUNTER — OFFICE VISIT (OUTPATIENT)
Dept: MEDICAL GROUP | Facility: MEDICAL CENTER | Age: 35
End: 2020-06-25
Payer: COMMERCIAL

## 2020-06-25 VITALS
SYSTOLIC BLOOD PRESSURE: 110 MMHG | HEART RATE: 78 BPM | TEMPERATURE: 98.1 F | DIASTOLIC BLOOD PRESSURE: 72 MMHG | OXYGEN SATURATION: 97 % | BODY MASS INDEX: 26.7 KG/M2 | HEIGHT: 60 IN | WEIGHT: 136 LBS

## 2020-06-25 DIAGNOSIS — L30.9 PERIORBITAL DERMATITIS: ICD-10-CM

## 2020-06-25 PROCEDURE — 99214 OFFICE O/P EST MOD 30 MIN: CPT | Performed by: FAMILY MEDICINE

## 2020-06-25 RX ORDER — DOXYCYCLINE HYCLATE 100 MG
100 TABLET ORAL 2 TIMES DAILY
Qty: 60 TAB | Refills: 1 | Status: SHIPPED | OUTPATIENT
Start: 2020-06-25 | End: 2020-07-25

## 2020-06-25 ASSESSMENT — FIBROSIS 4 INDEX: FIB4 SCORE: 0.69

## 2020-06-25 NOTE — PROGRESS NOTES
Subjective:   Pauline Perrin is a 34 y.o. female here today for periorbital dermatitis    Patient typically sees Chhaya FLORES, this is the first time I am seeing her.    Periorbital dermatitis  Started Decemeber, around entire nose and going to chin, but sparing upper lip. There is associated papules and redness, with itching. Thinks it may have been caused by accidentally touching her face to the mat at the gym.  Has tried baking soda, which helps a little.  Prescribed Lotrisone cream for 3 days at a time, which helps, but when she discontinues it comes back. Flaky after using the cream.  Never had a rash like this before.         Current medicines (including changes today)  Current Outpatient Medications   Medication Sig Dispense Refill   • doxycycline (VIBRAMYCIN) 100 MG Tab Take 1 Tab by mouth 2 times a day for 30 days. 60 Tab 1   • norethindrone (MICRONOR) 0.35 MG tablet Take 1 Tab by mouth every day. 28 Tab 11   • albuterol 108 (90 Base) MCG/ACT Aero Soln inhalation aerosol Inhale 2 Puffs by mouth every 6 hours as needed for Shortness of Breath. 8.5 g 3   • Probiotic Product (PROBIOTIC DAILY) Cap Take  by mouth.       No current facility-administered medications for this visit.      She  has a past medical history of Allergic reaction (8/17/2015), ASTHMA, Migraine (8/16/2018), and Mild intermittent asthma without complication (8/16/2018). She also has no past medical history of Allergy, Headache(784.0), Migraine, or Ulcer.    ROS   No fever, no mouth pain       Objective:     /72 (BP Location: Right arm, Patient Position: Sitting, BP Cuff Size: Adult)   Pulse 78   Temp 36.7 °C (98.1 °F) (Temporal)   Ht 1.524 m (5')   Wt 61.7 kg (136 lb)   SpO2 97%  Body mass index is 26.56 kg/m².   Physical Exam:  Constitutional: Alert, no distress.  Skin: Warm, dry, good turgor.  Macular papular rash around nose and chin, sparing upper lip.  Eye: Equal, round and reactive, conjunctiva clear, lids  normal.  Psych: Alert and oriented x3, normal affect and mood.        Assessment and Plan:   The following treatment plan was discussed    1. Periorbital dermatitis  New problem.  Prescription for doxycycline and advised to continue Lotrisone and use hydrocortisone 2 weeks on and one-week off.  Advised patient to avoid other topical moisturizer, antibiotics and minimize cosmetics.      Followup: Return if symptoms worsen or fail to improve.

## 2020-06-25 NOTE — ASSESSMENT & PLAN NOTE
Started Decemeber, around entire nose and going to chin, but sparing upper lip. There is associated papules and redness, with itching. Thinks it may have been caused by accidentally touching her face to the mat at the gym.  Has tried baking soda, which helps a little.  Prescribed Lotrisone cream for 3 days at a time, which helps, but when she discontinues it comes back. Flaky after using the cream.  Never had a rash like this before.

## 2020-06-26 ENCOUNTER — HOSPITAL ENCOUNTER (OUTPATIENT)
Dept: RADIOLOGY | Facility: MEDICAL CENTER | Age: 35
End: 2020-06-26
Attending: OBSTETRICS & GYNECOLOGY
Payer: COMMERCIAL

## 2020-06-26 DIAGNOSIS — N83.201 CYST OF RIGHT OVARY: ICD-10-CM

## 2020-06-26 DIAGNOSIS — Z97.5 PRESENCE OF INTRAUTERINE CONTRACEPTIVE DEVICE: ICD-10-CM

## 2020-06-26 PROCEDURE — 76830 TRANSVAGINAL US NON-OB: CPT

## 2020-08-10 DIAGNOSIS — N83.201 CYST OF RIGHT OVARY: ICD-10-CM

## 2020-08-21 ENCOUNTER — HOSPITAL ENCOUNTER (OUTPATIENT)
Dept: RADIOLOGY | Facility: MEDICAL CENTER | Age: 35
End: 2020-08-21
Attending: OBSTETRICS & GYNECOLOGY
Payer: COMMERCIAL

## 2020-08-21 DIAGNOSIS — N83.201 CYST OF RIGHT OVARY: ICD-10-CM

## 2020-08-21 PROCEDURE — 76830 TRANSVAGINAL US NON-OB: CPT

## 2020-08-28 RX ORDER — DOXYCYCLINE HYCLATE 100 MG
100 TABLET ORAL 2 TIMES DAILY
Qty: 60 TAB | Refills: 1 | OUTPATIENT
Start: 2020-08-28 | End: 2020-09-27

## 2020-08-28 NOTE — TELEPHONE ENCOUNTER
Please find out if patient's symptoms have returned again and if she needs a refill.     GLORY Sierra.

## 2020-09-23 ENCOUNTER — GYNECOLOGY VISIT (OUTPATIENT)
Dept: OBGYN | Facility: CLINIC | Age: 35
End: 2020-09-23
Payer: COMMERCIAL

## 2020-09-23 VITALS — SYSTOLIC BLOOD PRESSURE: 127 MMHG | BODY MASS INDEX: 27.34 KG/M2 | DIASTOLIC BLOOD PRESSURE: 77 MMHG | WEIGHT: 140 LBS

## 2020-09-23 DIAGNOSIS — N83.209 CYST OF OVARY, UNSPECIFIED LATERALITY: ICD-10-CM

## 2020-09-23 DIAGNOSIS — N63.0 BREAST LUMP: ICD-10-CM

## 2020-09-23 DIAGNOSIS — Z97.5 PRESENCE OF INTRAUTERINE CONTRACEPTIVE DEVICE: ICD-10-CM

## 2020-09-23 PROCEDURE — 99214 OFFICE O/P EST MOD 30 MIN: CPT | Performed by: OBSTETRICS & GYNECOLOGY

## 2020-09-23 NOTE — NON-PROVIDER
Pt here to f/u on US results.    Pt states she felt a lump on her right breast when she did her monthly breast exam.   Ilya# [phone]  LMP: Unknown   Pharmacy confirmed   Last PAP: 1/14/2020, [facility]

## 2020-09-23 NOTE — PROGRESS NOTES
GYN Visit  CC: follow up ovarian cyst     Pauline Perrin is a 34 y.o.  who presents today for follow-up for ovarian cyst.  Patient is been followed for quite some time for ovarian cyst.  She continues to have a 5.4 cm right ovarian cyst.  She reports that she is now consistently symptomatic from the cyst and desires definitive surgical management.  She reports that when she bends over or moves she feels a crampy sharp pain on her right side.  She has been considering her options and now does desire for it to be removed.  Also reports that she does monthly breast exams and recently noticed a small lump on the upper outer aspect of her right breast.  Denies any pain, skin changes, or other associated symptoms.  No trauma to the region. Denies any other complaints today.    FINDINGS:   Both transabdominal and transvaginal scanning were performed to optimally visualize the pelvis.     The uterus measures 4.50 cm x 9.39 cm x 5.00 cm.   The uterine myometrium is within normal limits.   The endometrial echo complex measures 0.59 cm.   IUD is in place, well positioned in the endometrial cavity.     Low resistive waveforms are confirmed within the ovaries.   The right ovary measures 6.24 cm x 3.79 cm x 5.93 cm. Right ovarian cyst measures 5.4 x 4.2 x 2.8 cm. No solid or vascular components.     The left ovary measures 2.81 cm x 1.75 cm x 1.97 cm.     There is no free fluid seen.    Impression:         1. Normal uterus, with well-positioned IUD.   2. A simple 5.4 cm right ovarian cyst, stable in size since prior study.   3. Normal left ovary.     OB History:    OB History    Para Term  AB Living   2 2 2     2   SAB TAB Ectopic Molar Multiple Live Births             2      # Outcome Date GA Lbr Gaurav/2nd Weight Sex Delivery Anes PTL Lv   2 Term 13 38w4d  3.26 kg (7 lb 3 oz) M Vag-Spont EPI  DANIEL   1 Term 08 40w0d  2.92 kg (6 lb 7 oz) F Vag-Spont EPI  DANIEL       Review of Systems:    Pertinent positives documented in HPI and all other systems reviewed & are negative.    All PMH, PSH, allergies, social history and FH reviewed and updated today:  Past Medical History:  Past Medical History:   Diagnosis Date   • Allergic reaction 8/17/2015   • ASTHMA     has a inhailer   • Migraine 8/16/2018   • Mild intermittent asthma without complication 8/16/2018       Past Surgical History:  No past surgical history on file.    Medications:   Current Outpatient Medications Ordered in Epic   Medication Sig Dispense Refill   • norethindrone (MICRONOR) 0.35 MG tablet Take 1 Tab by mouth every day. 28 Tab 11   • albuterol 108 (90 Base) MCG/ACT Aero Soln inhalation aerosol Inhale 2 Puffs by mouth every 6 hours as needed for Shortness of Breath. 8.5 g 3   • Probiotic Product (PROBIOTIC DAILY) Cap Take  by mouth.       No current Epic-ordered facility-administered medications on file.        Allergies: Nkda [no known drug allergy]    Social History:  Social History     Socioeconomic History   • Marital status:      Spouse name: Not on file   • Number of children: Not on file   • Years of education: Not on file   • Highest education level: Not on file   Occupational History   • Not on file   Social Needs   • Financial resource strain: Not on file   • Food insecurity     Worry: Not on file     Inability: Not on file   • Transportation needs     Medical: Not on file     Non-medical: Not on file   Tobacco Use   • Smoking status: Never Smoker   • Smokeless tobacco: Never Used   Substance and Sexual Activity   • Alcohol use: Yes     Comment: 1/week   • Drug use: No   • Sexual activity: Yes     Partners: Male   Lifestyle   • Physical activity     Days per week: Not on file     Minutes per session: Not on file   • Stress: Not on file   Relationships   • Social connections     Talks on phone: Not on file     Gets together: Not on file     Attends Mu-ism service: Not on file     Active member of club or  organization: Not on file     Attends meetings of clubs or organizations: Not on file     Relationship status: Not on file   • Intimate partner violence     Fear of current or ex partner: Not on file     Emotionally abused: Not on file     Physically abused: Not on file     Forced sexual activity: Not on file   Other Topics Concern   • Not on file   Social History Narrative   • Not on file       Family History:  Family History   Problem Relation Age of Onset   • Hypertension Mother    • Hypertension Father    • Arthritis Maternal Grandmother    • Autoimmune Disease Sister 18        lupus   • Stroke Brother 31   • No Known Problems Brother    • Diabetes Maternal Uncle    • Diabetes Paternal Aunt            Objective:   Vitals:  /77   Wt 63.5 kg (140 lb)   Body mass index is 27.34 kg/m². (Goal BM I>18 <25)    Physical Exam:   Nursing note and vitals reviewed.  GENERAL: No acute distress  HENT: Atraumatic, normocephalic  EYES: Extraocular movements intact, pupils equal and reactive to light  NECK: Supple, Full ROM  CHEST: No deformities, Equal chest expansion, breasts are symmetric, no skin changes or nipple abnormalities.  Right breast with 2 small (about 3 mm palpable lump which is well-circumscribed, mobile, and approximately 2 cm from the areola at 10:00  RESP: Unlabored, no stridor or audible wheeze  ABD: Soft, Nontender, Non-Distended  Extremities: No Clubbing, Cyanosis, or Edema  Skin: Warm/dry, without rases  Neuro: A/O x 4, CN 2-12 Grossly intact, Motor/sensory grossly intact  Psych: Normal mood, behavior, and affect    Genitourinary:  Normal appearing external female genitalia without any rashes, lesions, labial fusion or tenderness.  Vagina is pink moist and well rugated. Physiologic discharge present within vaginal vault.  Cervix well visualized without masses or lesions.  On bimanual exam there is no cervical motion tenderness, uterus is not enlarged, fixed, or tender.  Tender right adnexa with  mass.       Assessment/Plan:   Pauline Perrin is a 34 y.o.  female who presents for:    1. Breast lump  US-BREAST LIMITED-RIGHT   2. IUD (intrauterine device) in place     3. Cyst of ovary, unspecified laterality  REFERRAL TO OB/GYN SURGERY     #Breast lump.  Small palpable mass on right evident on exam today.  Breast ultrasound ordered for further follow-up.  #Right ovarian cyst.  Persistent for quite some time now.  Patient has symptomatic and desires definitive surgical management.  Referral placed for laparoscopic unilateral cystectomy versus oophorectomy and other indicated procedures.  We will be in contact with patient about scheduling.  #Follow up.  RTC for pre op or sooner if concerns or questions arise.    Patient was seen for 25 minutes of which > 50% of appointment time was spent on face-to-face counseling and coordination of care regarding the above.

## 2020-10-12 ENCOUNTER — HOSPITAL ENCOUNTER (OUTPATIENT)
Dept: RADIOLOGY | Facility: MEDICAL CENTER | Age: 35
End: 2020-10-12
Attending: OBSTETRICS & GYNECOLOGY
Payer: COMMERCIAL

## 2020-10-12 DIAGNOSIS — N63.0 BREAST LUMP: ICD-10-CM

## 2020-10-12 PROCEDURE — G0279 TOMOSYNTHESIS, MAMMO: HCPCS

## 2020-10-12 PROCEDURE — 76642 ULTRASOUND BREAST LIMITED: CPT | Mod: RT

## 2020-11-18 ENCOUNTER — GYNECOLOGY VISIT (OUTPATIENT)
Dept: OBGYN | Facility: CLINIC | Age: 35
End: 2020-11-18
Payer: COMMERCIAL

## 2020-11-18 ENCOUNTER — APPOINTMENT (OUTPATIENT)
Dept: MEDICAL GROUP | Facility: MEDICAL CENTER | Age: 35
End: 2020-11-18
Payer: COMMERCIAL

## 2020-11-18 VITALS — BODY MASS INDEX: 27.54 KG/M2 | DIASTOLIC BLOOD PRESSURE: 65 MMHG | SYSTOLIC BLOOD PRESSURE: 104 MMHG | WEIGHT: 141 LBS

## 2020-11-18 DIAGNOSIS — N83.201 CYST OF RIGHT OVARY: ICD-10-CM

## 2020-11-18 DIAGNOSIS — G89.18 POST-OP PAIN: ICD-10-CM

## 2020-11-18 DIAGNOSIS — Z97.5 PRESENCE OF INTRAUTERINE CONTRACEPTIVE DEVICE: ICD-10-CM

## 2020-11-18 PROCEDURE — 99213 OFFICE O/P EST LOW 20 MIN: CPT | Performed by: OBSTETRICS & GYNECOLOGY

## 2020-11-18 RX ORDER — IBUPROFEN 800 MG/1
800 TABLET ORAL EVERY 8 HOURS PRN
Qty: 30 TAB | Refills: 13 | Status: SHIPPED
Start: 2020-11-18 | End: 2020-12-09

## 2020-11-18 RX ORDER — ACETAMINOPHEN 325 MG/1
650 TABLET ORAL EVERY 4 HOURS PRN
Qty: 30 TAB | Refills: 0 | Status: SHIPPED
Start: 2020-11-18 | End: 2020-12-09

## 2020-11-18 RX ORDER — OXYCODONE HYDROCHLORIDE 5 MG/1
5 TABLET ORAL EVERY 4 HOURS PRN
Qty: 10 TAB | Refills: 0 | Status: SHIPPED | OUTPATIENT
Start: 2020-11-18 | End: 2020-11-22

## 2020-11-18 NOTE — NON-PROVIDER
Patient here today for pre-op appointment.  Surgery on 12/16/2020  Phone # [phone]  Pharmacy verified.

## 2020-11-18 NOTE — PROGRESS NOTES
GYN Visit  CC: pre op visit     Pauline Perrin is a 34 y.o.  who presents today for pre op visit prior to planned laparoscopic unilateral cystectomy versus oophorectomy and other indicated procedures.  Patient reports that she has continued to be symptomatic from this cyst and desires definitive surgical management.  She feels pressure and pain in her right lower quadrant.  Denies any other complaints today and is ready for surgery.    The uterus measures 4.50 cm x 9.39 cm x 5.00 cm.  The uterine myometrium is within normal limits.  The endometrial echo complex measures 0.59 cm.  IUD is in place, well positioned in the endometrial cavity.  Low resistive waveforms are confirmed within the ovaries.  The right ovary measures 6.24 cm x 3.79 cm x 5.93 cm. Right ovarian cyst measures 5.4 x 4.2 x 2.8 cm. No solid or vascular components.     The left ovary measures 2.81 cm x 1.75 cm x 1.97 cm.     There is no free fluid seen.     IMPRESSION:  1. Normal uterus, with well-positioned IUD.  2. A simple 5.4 cm right ovarian cyst, stable in size since prior study.  3. Normal left ovary.    GYN History  No menses - has Mirena.  Last pap 20,  no h/o abnormal pap.  No history of cone biopsy, LEEP or any other cervical, uterine or gynecologic surgery. No history of sexually transmitted diseases.  Currently sexually active with one male partner.  Utilizing Mirnea for contraception and has used OCPs in the past.     OB History:    OB History    Para Term  AB Living   2 2 2     2   SAB TAB Ectopic Molar Multiple Live Births             2      # Outcome Date GA Lbr Gaurav/2nd Weight Sex Delivery Anes PTL Lv   2 Term 13 38w4d  3.26 kg (7 lb 3 oz) M Vag-Spont EPI  DANIEL   1 Term 08 40w0d  2.92 kg (6 lb 7 oz) F Vag-Spont EPI  DANIEL       Review of Systems:   Pertinent positives documented in HPI and all other systems reviewed & are negative.    All PMH, PSH, allergies, social history and FH reviewed  and updated today:  Past Medical History:  Past Medical History:   Diagnosis Date   • Allergic reaction 8/17/2015   • ASTHMA     has a inhailer   • Migraine 8/16/2018   • Mild intermittent asthma without complication 8/16/2018       Past Surgical History:  History reviewed. No pertinent surgical history.    Medications:   Current Outpatient Medications Ordered in Epic   Medication Sig Dispense Refill   • ibuprofen (MOTRIN) 800 MG Tab Take 1 Tab by mouth every 8 hours as needed. 30 Tab 13   • acetaminophen (TYLENOL) 325 MG Tab Take 2 Tabs by mouth every four hours as needed. 30 Tab 0   • oxyCODONE immediate-release (ROXICODONE) 5 MG Tab Take 1 Tab by mouth every four hours as needed for Severe Pain for up to 4 days. 10 Tab 0   • Probiotic Product (PROBIOTIC DAILY) Cap Take  by mouth.       No current Epic-ordered facility-administered medications on file.        Allergies: Nkda [no known drug allergy]    Social History:  Social History     Socioeconomic History   • Marital status:      Spouse name: Not on file   • Number of children: Not on file   • Years of education: Not on file   • Highest education level: Not on file   Occupational History   • Not on file   Social Needs   • Financial resource strain: Not on file   • Food insecurity     Worry: Not on file     Inability: Not on file   • Transportation needs     Medical: Not on file     Non-medical: Not on file   Tobacco Use   • Smoking status: Never Smoker   • Smokeless tobacco: Never Used   Substance and Sexual Activity   • Alcohol use: Yes     Comment: 1/week   • Drug use: No   • Sexual activity: Yes     Partners: Male   Lifestyle   • Physical activity     Days per week: Not on file     Minutes per session: Not on file   • Stress: Not on file   Relationships   • Social connections     Talks on phone: Not on file     Gets together: Not on file     Attends Orthodoxy service: Not on file     Active member of club or organization: Not on file     Attends  meetings of clubs or organizations: Not on file     Relationship status: Not on file   • Intimate partner violence     Fear of current or ex partner: Not on file     Emotionally abused: Not on file     Physically abused: Not on file     Forced sexual activity: Not on file   Other Topics Concern   • Not on file   Social History Narrative   • Not on file       Family History:  Family History   Problem Relation Age of Onset   • Hypertension Mother    • Hypertension Father    • Arthritis Maternal Grandmother    • Autoimmune Disease Sister 18        lupus   • Stroke Brother 31   • No Known Problems Brother    • Diabetes Maternal Uncle    • Diabetes Paternal Aunt            Objective:   Vitals:  /65 (BP Location: Right arm, Patient Position: Sitting)   Wt 64 kg (141 lb)   Body mass index is 27.54 kg/m². (Goal BM I>18 <25)    Physical Exam:   Nursing note and vitals reviewed.  GENERAL: No acute distress  HENT: Atraumatic, normocephalic  EYES: Extraocular movements intact, pupils equal and reactive to light  NECK: Supple, Full ROM  CHEST: No deformities, Equal chest expansion  RESP: Unlabored, no stridor or audible wheeze  ABD: Soft, Nontender, Non-Distended  Extremities: No Clubbing, Cyanosis, or Edema  Skin: Warm/dry, without rases  Neuro: A/O x 4, CN 2-12 Grossly intact, Motor/sensory grossly intact  Psych: Normal mood, behavior, and affect    Genitourinary:  Normal appearing external female genitalia without any rashes, lesions, labial fusion or tenderness.  Bimanual exam was performed with significant fullness appreciated in the right adnexa.  Associated tenderness.  No left adnexal mass appreciated.  Uterus is anteverted, mobile, not enlarged or tender.     Assessment/Plan:   Pauline Perrin is a 34 y.o.  female who presents for:    1. Post-op pain  ibuprofen (MOTRIN) 800 MG Tab    acetaminophen (TYLENOL) 325 MG Tab    oxyCODONE immediate-release (ROXICODONE) 5 MG Tab   2. IUD (intrauterine  device) in place     3. Cyst of right ovary       #Right ovarian cyst.  Plan for laparoscopic cystectomy versus oophorectomy and other indicated procedures.  Discussed details of procedure with patient today.  Risks benefits and alternatives are addressed.  Risks include but are not limited to infection, bleeding, damage to surrounding second structures, possible need to remove ovary in entirety.  No cyst was seen on contralateral ovary on ultrasound however discussed that if cyst is seen on other ovary may address that at the time of surgery depending on how it looks.  Patient is amenable.  Preoperative instructions and postoperative expectations are addressed today.  Additional risks of Covid and screening for this are addressed.  Prescription sent to pharmacy.  #Follow up.  RTC for postop appointment or sooner if concerns or questions arise.

## 2020-12-09 ENCOUNTER — PRE-ADMISSION TESTING (OUTPATIENT)
Dept: ADMISSIONS | Facility: MEDICAL CENTER | Age: 35
End: 2020-12-09
Attending: OBSTETRICS & GYNECOLOGY
Payer: COMMERCIAL

## 2020-12-09 DIAGNOSIS — Z01.812 PRE-OPERATIVE LABORATORY EXAMINATION: ICD-10-CM

## 2020-12-09 RX ORDER — CHLORAL HYDRATE 500 MG
1000 CAPSULE ORAL DAILY
COMMUNITY
End: 2022-10-26

## 2020-12-12 ENCOUNTER — APPOINTMENT (OUTPATIENT)
Dept: ADMISSIONS | Facility: MEDICAL CENTER | Age: 35
End: 2020-12-12
Attending: OBSTETRICS & GYNECOLOGY
Payer: COMMERCIAL

## 2020-12-14 ENCOUNTER — PRE-ADMISSION TESTING (OUTPATIENT)
Dept: ADMISSIONS | Facility: MEDICAL CENTER | Age: 35
End: 2020-12-14
Attending: OBSTETRICS & GYNECOLOGY
Payer: COMMERCIAL

## 2020-12-14 DIAGNOSIS — G89.18 POST-OP PAIN: ICD-10-CM

## 2020-12-14 DIAGNOSIS — Z01.812 PRE-OPERATIVE LABORATORY EXAMINATION: ICD-10-CM

## 2020-12-14 LAB
ANION GAP SERPL CALC-SCNC: 6 MMOL/L (ref 7–16)
BUN SERPL-MCNC: 13 MG/DL (ref 8–22)
CALCIUM SERPL-MCNC: 9.3 MG/DL (ref 8.5–10.5)
CHLORIDE SERPL-SCNC: 100 MMOL/L (ref 96–112)
CO2 SERPL-SCNC: 26 MMOL/L (ref 20–33)
COVID ORDER STATUS COVID19: NORMAL
CREAT SERPL-MCNC: 0.72 MG/DL (ref 0.5–1.4)
ERYTHROCYTE [DISTWIDTH] IN BLOOD BY AUTOMATED COUNT: 41.1 FL (ref 35.9–50)
GLUCOSE SERPL-MCNC: 96 MG/DL (ref 65–99)
HCG SERPL QL: NEGATIVE
HCT VFR BLD AUTO: 41.3 % (ref 37–47)
HGB BLD-MCNC: 13.8 G/DL (ref 12–16)
MCH RBC QN AUTO: 30.9 PG (ref 27–33)
MCHC RBC AUTO-ENTMCNC: 33.4 G/DL (ref 33.6–35)
MCV RBC AUTO: 92.6 FL (ref 81.4–97.8)
PLATELET # BLD AUTO: 233 K/UL (ref 164–446)
PMV BLD AUTO: 11.9 FL (ref 9–12.9)
POTASSIUM SERPL-SCNC: 4.2 MMOL/L (ref 3.6–5.5)
RBC # BLD AUTO: 4.46 M/UL (ref 4.2–5.4)
SARS-COV-2 RNA RESP QL NAA+PROBE: NOTDETECTED
SODIUM SERPL-SCNC: 132 MMOL/L (ref 135–145)
SPECIMEN SOURCE: NORMAL
WBC # BLD AUTO: 5.4 K/UL (ref 4.8–10.8)

## 2020-12-14 PROCEDURE — 80048 BASIC METABOLIC PNL TOTAL CA: CPT

## 2020-12-14 PROCEDURE — 85027 COMPLETE CBC AUTOMATED: CPT

## 2020-12-14 PROCEDURE — 84703 CHORIONIC GONADOTROPIN ASSAY: CPT

## 2020-12-14 PROCEDURE — U0003 INFECTIOUS AGENT DETECTION BY NUCLEIC ACID (DNA OR RNA); SEVERE ACUTE RESPIRATORY SYNDROME CORONAVIRUS 2 (SARS-COV-2) (CORONAVIRUS DISEASE [COVID-19]), AMPLIFIED PROBE TECHNIQUE, MAKING USE OF HIGH THROUGHPUT TECHNOLOGIES AS DESCRIBED BY CMS-2020-01-R: HCPCS

## 2020-12-14 PROCEDURE — 36415 COLL VENOUS BLD VENIPUNCTURE: CPT

## 2020-12-14 RX ORDER — OXYCODONE HYDROCHLORIDE 5 MG/1
5 TABLET ORAL EVERY 4 HOURS PRN
Qty: 10 TAB | Refills: 0 | Status: ON HOLD
Start: 2020-12-14 | End: 2020-12-16

## 2020-12-16 ENCOUNTER — HOSPITAL ENCOUNTER (OUTPATIENT)
Facility: MEDICAL CENTER | Age: 35
End: 2020-12-16
Attending: OBSTETRICS & GYNECOLOGY | Admitting: OBSTETRICS & GYNECOLOGY
Payer: COMMERCIAL

## 2020-12-16 ENCOUNTER — ANESTHESIA (OUTPATIENT)
Dept: SURGERY | Facility: MEDICAL CENTER | Age: 35
End: 2020-12-16
Payer: COMMERCIAL

## 2020-12-16 ENCOUNTER — ANESTHESIA EVENT (OUTPATIENT)
Dept: SURGERY | Facility: MEDICAL CENTER | Age: 35
End: 2020-12-16
Payer: COMMERCIAL

## 2020-12-16 VITALS
SYSTOLIC BLOOD PRESSURE: 113 MMHG | RESPIRATION RATE: 16 BRPM | OXYGEN SATURATION: 98 % | HEART RATE: 86 BPM | WEIGHT: 136.69 LBS | HEIGHT: 65 IN | TEMPERATURE: 97.3 F | DIASTOLIC BLOOD PRESSURE: 62 MMHG | BODY MASS INDEX: 22.77 KG/M2

## 2020-12-16 LAB — PATHOLOGY CONSULT NOTE: NORMAL

## 2020-12-16 PROCEDURE — 700105 HCHG RX REV CODE 258: Performed by: OBSTETRICS & GYNECOLOGY

## 2020-12-16 PROCEDURE — 700101 HCHG RX REV CODE 250: Performed by: ANESTHESIOLOGY

## 2020-12-16 PROCEDURE — A9270 NON-COVERED ITEM OR SERVICE: HCPCS | Performed by: OBSTETRICS & GYNECOLOGY

## 2020-12-16 PROCEDURE — 160035 HCHG PACU - 1ST 60 MINS PHASE I: Performed by: OBSTETRICS & GYNECOLOGY

## 2020-12-16 PROCEDURE — 500886 HCHG PACK, LAPAROSCOPY: Performed by: OBSTETRICS & GYNECOLOGY

## 2020-12-16 PROCEDURE — 160025 RECOVERY II MINUTES (STATS): Performed by: OBSTETRICS & GYNECOLOGY

## 2020-12-16 PROCEDURE — 500868 HCHG NEEDLE, SURGI(VARES): Performed by: OBSTETRICS & GYNECOLOGY

## 2020-12-16 PROCEDURE — 700111 HCHG RX REV CODE 636 W/ 250 OVERRIDE (IP): Performed by: ANESTHESIOLOGY

## 2020-12-16 PROCEDURE — 49321 LAPAROSCOPY BIOPSY: CPT | Mod: 80 | Performed by: OBSTETRICS & GYNECOLOGY

## 2020-12-16 PROCEDURE — 160009 HCHG ANES TIME/MIN: Performed by: OBSTETRICS & GYNECOLOGY

## 2020-12-16 PROCEDURE — 160029 HCHG SURGERY MINUTES - 1ST 30 MINS LEVEL 4: Performed by: OBSTETRICS & GYNECOLOGY

## 2020-12-16 PROCEDURE — 501838 HCHG SUTURE GENERAL: Performed by: OBSTETRICS & GYNECOLOGY

## 2020-12-16 PROCEDURE — 49321 LAPAROSCOPY BIOPSY: CPT | Performed by: OBSTETRICS & GYNECOLOGY

## 2020-12-16 PROCEDURE — A9270 NON-COVERED ITEM OR SERVICE: HCPCS | Performed by: ANESTHESIOLOGY

## 2020-12-16 PROCEDURE — 700101 HCHG RX REV CODE 250: Performed by: OBSTETRICS & GYNECOLOGY

## 2020-12-16 PROCEDURE — 160002 HCHG RECOVERY MINUTES (STAT): Performed by: OBSTETRICS & GYNECOLOGY

## 2020-12-16 PROCEDURE — 160048 HCHG OR STATISTICAL LEVEL 1-5: Performed by: OBSTETRICS & GYNECOLOGY

## 2020-12-16 PROCEDURE — 160046 HCHG PACU - 1ST 60 MINS PHASE II: Performed by: OBSTETRICS & GYNECOLOGY

## 2020-12-16 PROCEDURE — 88305 TISSUE EXAM BY PATHOLOGIST: CPT

## 2020-12-16 PROCEDURE — 160041 HCHG SURGERY MINUTES - EA ADDL 1 MIN LEVEL 4: Performed by: OBSTETRICS & GYNECOLOGY

## 2020-12-16 PROCEDURE — 501582 HCHG TROCAR, THRD BLADED: Performed by: OBSTETRICS & GYNECOLOGY

## 2020-12-16 PROCEDURE — 160036 HCHG PACU - EA ADDL 30 MINS PHASE I: Performed by: OBSTETRICS & GYNECOLOGY

## 2020-12-16 PROCEDURE — 501570 HCHG TROCAR, SEPARATOR: Performed by: OBSTETRICS & GYNECOLOGY

## 2020-12-16 PROCEDURE — 500002 HCHG ADHESIVE, DERMABOND: Performed by: OBSTETRICS & GYNECOLOGY

## 2020-12-16 PROCEDURE — 700102 HCHG RX REV CODE 250 W/ 637 OVERRIDE(OP): Performed by: ANESTHESIOLOGY

## 2020-12-16 RX ORDER — ACETAMINOPHEN 500 MG
1000 TABLET ORAL ONCE
Status: COMPLETED | OUTPATIENT
Start: 2020-12-16 | End: 2020-12-16

## 2020-12-16 RX ORDER — OXYCODONE HCL 5 MG/5 ML
5 SOLUTION, ORAL ORAL
Status: COMPLETED | OUTPATIENT
Start: 2020-12-16 | End: 2020-12-16

## 2020-12-16 RX ORDER — HALOPERIDOL 5 MG/ML
1 INJECTION INTRAMUSCULAR
Status: DISCONTINUED | OUTPATIENT
Start: 2020-12-16 | End: 2020-12-16 | Stop reason: HOSPADM

## 2020-12-16 RX ORDER — HYDROMORPHONE HYDROCHLORIDE 1 MG/ML
0.1 INJECTION, SOLUTION INTRAMUSCULAR; INTRAVENOUS; SUBCUTANEOUS
Status: DISCONTINUED | OUTPATIENT
Start: 2020-12-16 | End: 2020-12-16 | Stop reason: HOSPADM

## 2020-12-16 RX ORDER — DIPHENHYDRAMINE HYDROCHLORIDE 50 MG/ML
12.5 INJECTION INTRAMUSCULAR; INTRAVENOUS
Status: DISCONTINUED | OUTPATIENT
Start: 2020-12-16 | End: 2020-12-16 | Stop reason: HOSPADM

## 2020-12-16 RX ORDER — BUPIVACAINE HYDROCHLORIDE 2.5 MG/ML
INJECTION, SOLUTION EPIDURAL; INFILTRATION; INTRACAUDAL
Status: DISCONTINUED
Start: 2020-12-16 | End: 2020-12-16 | Stop reason: HOSPADM

## 2020-12-16 RX ORDER — SODIUM CHLORIDE, SODIUM LACTATE, POTASSIUM CHLORIDE, CALCIUM CHLORIDE 600; 310; 30; 20 MG/100ML; MG/100ML; MG/100ML; MG/100ML
INJECTION, SOLUTION INTRAVENOUS CONTINUOUS
Status: DISCONTINUED | OUTPATIENT
Start: 2020-12-16 | End: 2020-12-16 | Stop reason: HOSPADM

## 2020-12-16 RX ORDER — EPINEPHRINE 1 MG/ML(1)
AMPUL (ML) INJECTION
Status: DISCONTINUED
Start: 2020-12-16 | End: 2020-12-16 | Stop reason: HOSPADM

## 2020-12-16 RX ORDER — IPRATROPIUM BROMIDE AND ALBUTEROL SULFATE 2.5; .5 MG/3ML; MG/3ML
3 SOLUTION RESPIRATORY (INHALATION)
Status: DISCONTINUED | OUTPATIENT
Start: 2020-12-16 | End: 2020-12-16 | Stop reason: HOSPADM

## 2020-12-16 RX ORDER — ONDANSETRON 2 MG/ML
INJECTION INTRAMUSCULAR; INTRAVENOUS PRN
Status: DISCONTINUED | OUTPATIENT
Start: 2020-12-16 | End: 2020-12-16 | Stop reason: SURG

## 2020-12-16 RX ORDER — HYDROMORPHONE HYDROCHLORIDE 1 MG/ML
0.4 INJECTION, SOLUTION INTRAMUSCULAR; INTRAVENOUS; SUBCUTANEOUS
Status: DISCONTINUED | OUTPATIENT
Start: 2020-12-16 | End: 2020-12-16 | Stop reason: HOSPADM

## 2020-12-16 RX ORDER — CEFAZOLIN SODIUM 1 G/3ML
INJECTION, POWDER, FOR SOLUTION INTRAMUSCULAR; INTRAVENOUS PRN
Status: DISCONTINUED | OUTPATIENT
Start: 2020-12-16 | End: 2020-12-16 | Stop reason: SURG

## 2020-12-16 RX ORDER — LABETALOL HYDROCHLORIDE 5 MG/ML
5 INJECTION, SOLUTION INTRAVENOUS
Status: DISCONTINUED | OUTPATIENT
Start: 2020-12-16 | End: 2020-12-16 | Stop reason: HOSPADM

## 2020-12-16 RX ORDER — LIDOCAINE HYDROCHLORIDE 20 MG/ML
INJECTION, SOLUTION EPIDURAL; INFILTRATION; INTRACAUDAL; PERINEURAL PRN
Status: DISCONTINUED | OUTPATIENT
Start: 2020-12-16 | End: 2020-12-16 | Stop reason: SURG

## 2020-12-16 RX ORDER — METOPROLOL TARTRATE 1 MG/ML
1 INJECTION, SOLUTION INTRAVENOUS
Status: DISCONTINUED | OUTPATIENT
Start: 2020-12-16 | End: 2020-12-16 | Stop reason: HOSPADM

## 2020-12-16 RX ORDER — ONDANSETRON 2 MG/ML
4 INJECTION INTRAMUSCULAR; INTRAVENOUS
Status: DISCONTINUED | OUTPATIENT
Start: 2020-12-16 | End: 2020-12-16 | Stop reason: HOSPADM

## 2020-12-16 RX ORDER — ROCURONIUM BROMIDE 10 MG/ML
INJECTION, SOLUTION INTRAVENOUS PRN
Status: DISCONTINUED | OUTPATIENT
Start: 2020-12-16 | End: 2020-12-16 | Stop reason: SURG

## 2020-12-16 RX ORDER — DEXAMETHASONE SODIUM PHOSPHATE 4 MG/ML
INJECTION, SOLUTION INTRA-ARTICULAR; INTRALESIONAL; INTRAMUSCULAR; INTRAVENOUS; SOFT TISSUE PRN
Status: DISCONTINUED | OUTPATIENT
Start: 2020-12-16 | End: 2020-12-16 | Stop reason: SURG

## 2020-12-16 RX ORDER — OXYCODONE HCL 5 MG/5 ML
10 SOLUTION, ORAL ORAL
Status: COMPLETED | OUTPATIENT
Start: 2020-12-16 | End: 2020-12-16

## 2020-12-16 RX ORDER — HYDROMORPHONE HYDROCHLORIDE 1 MG/ML
0.2 INJECTION, SOLUTION INTRAMUSCULAR; INTRAVENOUS; SUBCUTANEOUS
Status: DISCONTINUED | OUTPATIENT
Start: 2020-12-16 | End: 2020-12-16 | Stop reason: HOSPADM

## 2020-12-16 RX ORDER — MIDAZOLAM HYDROCHLORIDE 1 MG/ML
INJECTION INTRAMUSCULAR; INTRAVENOUS PRN
Status: DISCONTINUED | OUTPATIENT
Start: 2020-12-16 | End: 2020-12-16 | Stop reason: SURG

## 2020-12-16 RX ORDER — BUPIVACAINE HYDROCHLORIDE AND EPINEPHRINE 2.5; 5 MG/ML; UG/ML
INJECTION, SOLUTION EPIDURAL; INFILTRATION; INTRACAUDAL; PERINEURAL
Status: DISCONTINUED | OUTPATIENT
Start: 2020-12-16 | End: 2020-12-16 | Stop reason: HOSPADM

## 2020-12-16 RX ORDER — CELECOXIB 200 MG/1
400 CAPSULE ORAL ONCE
Status: COMPLETED | OUTPATIENT
Start: 2020-12-16 | End: 2020-12-16

## 2020-12-16 RX ADMIN — IPRATROPIUM BROMIDE AND ALBUTEROL SULFATE 3 ML: .5; 3 SOLUTION RESPIRATORY (INHALATION) at 15:21

## 2020-12-16 RX ADMIN — SUGAMMADEX 50 MG: 100 INJECTION, SOLUTION INTRAVENOUS at 14:55

## 2020-12-16 RX ADMIN — MIDAZOLAM HYDROCHLORIDE 2 MG: 1 INJECTION, SOLUTION INTRAMUSCULAR; INTRAVENOUS at 14:14

## 2020-12-16 RX ADMIN — ROCURONIUM BROMIDE 35 MG: 10 INJECTION, SOLUTION INTRAVENOUS at 14:19

## 2020-12-16 RX ADMIN — FENTANYL CITRATE 50 MCG: 50 INJECTION, SOLUTION INTRAMUSCULAR; INTRAVENOUS at 15:30

## 2020-12-16 RX ADMIN — FENTANYL CITRATE 100 MCG: 50 INJECTION, SOLUTION INTRAMUSCULAR; INTRAVENOUS at 14:17

## 2020-12-16 RX ADMIN — DEXAMETHASONE SODIUM PHOSPHATE 8 MG: 4 INJECTION, SOLUTION INTRA-ARTICULAR; INTRALESIONAL; INTRAMUSCULAR; INTRAVENOUS; SOFT TISSUE at 14:21

## 2020-12-16 RX ADMIN — SODIUM CHLORIDE, POTASSIUM CHLORIDE, SODIUM LACTATE AND CALCIUM CHLORIDE: 600; 310; 30; 20 INJECTION, SOLUTION INTRAVENOUS at 14:57

## 2020-12-16 RX ADMIN — CELECOXIB 400 MG: 200 CAPSULE ORAL at 11:37

## 2020-12-16 RX ADMIN — CEFAZOLIN 2 G: 330 INJECTION, POWDER, FOR SOLUTION INTRAMUSCULAR; INTRAVENOUS at 14:22

## 2020-12-16 RX ADMIN — ACETAMINOPHEN 1000 MG: 500 TABLET ORAL at 11:37

## 2020-12-16 RX ADMIN — LIDOCAINE HYDROCHLORIDE 100 MG: 20 INJECTION, SOLUTION EPIDURAL; INFILTRATION; INTRACAUDAL at 14:18

## 2020-12-16 RX ADMIN — FENTANYL CITRATE 50 MCG: 50 INJECTION, SOLUTION INTRAMUSCULAR; INTRAVENOUS at 14:39

## 2020-12-16 RX ADMIN — SODIUM CHLORIDE, POTASSIUM CHLORIDE, SODIUM LACTATE AND CALCIUM CHLORIDE: 600; 310; 30; 20 INJECTION, SOLUTION INTRAVENOUS at 11:39

## 2020-12-16 RX ADMIN — POVIDONE IODINE 15 ML: 100 SOLUTION TOPICAL at 11:37

## 2020-12-16 RX ADMIN — PROPOFOL 170 MG: 10 INJECTION, EMULSION INTRAVENOUS at 14:19

## 2020-12-16 RX ADMIN — ONDANSETRON 4 MG: 2 INJECTION INTRAMUSCULAR; INTRAVENOUS at 14:58

## 2020-12-16 RX ADMIN — OXYCODONE HYDROCHLORIDE 10 MG: 5 SOLUTION ORAL at 15:31

## 2020-12-16 ASSESSMENT — PAIN SCALES - GENERAL: PAIN_LEVEL: 3

## 2020-12-16 NOTE — ANESTHESIA TIME REPORT
Anesthesia Start and Stop Event Times     Date Time Event    12/16/2020 1350 Ready for Procedure     1413 Anesthesia Start     1526 Anesthesia Stop        Responsible Staff  12/16/20    Name Role Begin End    Alirio Mcduffie M.D. Anesth 1413 1526        Preop Diagnosis (Free Text):  Pre-op Diagnosis     CYST OF OVARY, UNSPECIFIED LATERALITY        Preop Diagnosis (Codes):    Post op Diagnosis  Endometriosis      Premium Reason  A. 3PM - 7AM    Comments:

## 2020-12-16 NOTE — OP REPORT
PREOPERATIVE DIAGNOSES:   Right ovarian cyst, pelvic pain     POSTOPERATIVE DIAGNOSES:   Same      Indication for procedure:  34 y.o.  who has been followed for quite some time for ovarian cysts.  Patient initially had bilateral cysts which were followed with ultrasound.  The right ovarian cyst has persisted measuring 5-1/2 cm on most recent ultrasound. Patient was experiencing pelvic pain and dyspareunia which she attributed to the cyst.  She therefore desired surgical removal.    SURGEON: Clara Beaulieu DO    ASSIST: Ana Hinkle DO     PROCEDURES PERFORMED:   Diagnostic laparoscopy, peritoneal sampling       ANESTHESIA: General endotracheal anesthesia.      ANESTHESIOLOGIST: Dr. Potter     ESTIMATED BLOOD LOSS: 5 mL      FINDINGS: NEFG, normal appearing upper abdominal anatomy, uterus, bilateral tubes and ovaries.    Small functional cyst present on left ovary.  No right ovarian mass nor cyst.  Windows within the peritoneum were present in the posterior cul-de-sac consistent with diagnosis of endometriosis.  No other endometriosis lesions seen.     COMPLICATIONS: none      PROCEDURE IN DETAIL: Patient is identified in the preoperative area.  Procedure and name are verified.  Informed consent is verified and patient reports all of her questions have been answered to her satisfaction.  The patient was then taken to the operating room and transferred to the OR table in supine position.  General endotracheal anesthesia was induced without difficulty.  The patient was placed in dorsal lithotomy position with her arms tucked at each side with careful attention to pad all flexion points.  Serial compression devices were activated.  A timeout was performed in which the patient's identity and procedures to be performed were all agreed upon by all members present. She was then prepped and draped in the usual sterile fashion.  Bladder was emptied under sterile technique.  Spounge stick was placed in the  vagina. Surgeon then changed gloves and attention was turned to the abdominal portion of the case.  Approximately 5 cc of quarter percent Marcaine was injected into the base of the umbilicus.  A 5 mm incision was made at the base of the umbilicus.  A Veress needle was used to enter the peritoneal cavity with an opening pressure of 4 mmHg.  Pressure was then turned to high flow and the abdomen was insufflated with CO2 gas at a pressure of 15 mmHg and pneumoperitoneum was maintained.  A 5 mm bladed trocar was inserted directly.  Intra-abdominal entry was confirmed with the laparoscope inserted which visually confirmed an atraumatic entry.    An additional 5 mm trocar was placed in the left lower quadrant under direct intra-abdominal visualization after infiltrating the skin with quarter percent Marcaine.  Complete survey was then performed with findings as noted above.  Decision was made to sample peritoneum given finding of windows in the peritoneum seen in the posterior cul-de-sac consistent with endometriosis.  A second 5 mm trocar was therefore placed in the right lower quadrant under direct visualization after infiltrating the skin with quarter percent Marcaine.  The uterus was elevated and the peritoneum in the posterior cul-de-sac was elevated and then transected using the LigaSure device.  Sample of peritoneum was sent to pathology for evaluation.  Site of dissection was confirmed to be hemostatic.  The entire abdomen was again carefully surveyed with no other endometrial lesions seen.      All instruments were removed under direct visualization and CO2 gas expelled from abdomen and extra breaths were given by anesthesia.  The skin incisions were reapproximated with 4-0 monocryl subcuticular fashion. Dermabond glue was placed over the incisions.    Sponge stick was removed from the vagina.     Sponge, needle, instrument, and lap counts were correct x2. Patient tolerated the procedure well and went to recovery  room in stable condition.         ____________________________________   DO [facility], Women's Health

## 2020-12-16 NOTE — ANESTHESIA PROCEDURE NOTES
Airway    Date/Time: 12/16/2020 2:20 PM  Performed by: Alirio Mcduffie M.D.  Authorized by: Alirio Mcduffie M.D.     Location:  OR  Urgency:  Elective  Indications for Airway Management:  Anesthesia      Spontaneous Ventilation: absent    Sedation Level:  Deep  Preoxygenated: Yes    Patient Position:  Sniffing  Mask Difficulty Assessment:  0 - not attempted  Final Airway Type:  Endotracheal airway  Final Endotracheal Airway:  ETT  Cuffed: Yes    Technique Used for Successful ETT Placement:  Direct laryngoscopy    Insertion Site:  Oral  Blade Type:  Brad  Laryngoscope Blade/Videolaryngoscope Blade Size:  3  ETT Size (mm):  7.0  Measured from:  Lips  ETT to Lips (cm):  22  Placement Verified by: capnometry    Cormack-Lehane Classification:  Grade I - full view of glottis  Number of Attempts at Approach:  1  Ventilation Between Attempts:  None  Number of Other Approaches Attempted:  0

## 2020-12-16 NOTE — ANESTHESIA PREPROCEDURE EVALUATION
Relevant Problems   PULMONARY   (+) Mild intermittent asthma without complication      CARDIAC   (+) Migraine      Other   (+) IUD (intrauterine device) in place   (+) Lower abdominal pain       Physical Exam    Airway   Mallampati: II  TM distance: >3 FB  Neck ROM: full       Cardiovascular - normal exam  Rhythm: regular  Rate: normal  (-) murmur  Comments: By palpation of radial atery   Dental - normal exam           Pulmonary   Comments: No concerns   Abdominal    Neurological     Comments: A&Ox4, grossly intact             Anesthesia Plan    ASA 2       Plan - general       Airway plan will be ETT        Induction: intravenous    Postoperative Plan: Postoperative administration of opioids is intended.    Pertinent diagnostic labs and testing reviewed    Informed Consent:    Anesthetic plan and risks discussed with patient.    Use of blood products discussed with: patient whom consented to blood products.

## 2020-12-16 NOTE — PROGRESS NOTES
"1515- Pt brought to PACU. Report received. Oxygenating well on 4 L.    1521- Pt stating \"I feel like I cant breath\". Ipratropium-albuterol treatment administered.  at bedside.    1525- Pt states she feels like her breathing is getting better.    1530-Pt reporting 8/10 pain. 50 mcg IV Fentanyl and 10mg oral oxy administered.    1647- Pts  called and updated. Discharge plan discussed with .  explains understanding. Pt transferred from phase 2.    1710- Pt dressing with help, no bleeding noted to matthew pad.    1720- Patient discharged via wheelchair.      "

## 2020-12-16 NOTE — ANESTHESIA POSTPROCEDURE EVALUATION
Patient: Pauline Perrin    Procedure Summary     Date: 12/16/20 Room / Location: [facility] ROOM 25 / SURGERY SAME DAY [facility]    Anesthesia Start: 1413 Anesthesia Stop:     Procedure: PELVISCOPY (Abdomen) Diagnosis: (endometriosis)    Surgeons: Clara Beaulieu D.O. Responsible Provider: Alirio Mcduffie M.D.    Anesthesia Type: general ASA Status: 2          Final Anesthesia Type: general  Last vitals  BP   Blood Pressure: 120/68    Temp   36.3 °C (97.3 °F)    Pulse   Pulse: 89   Resp   16    SpO2   100 %      Anesthesia Post Evaluation    Patient location during evaluation: PACU  Patient participation: complete - patient participated  Level of consciousness: awake  Pain score: 3    Airway patency: patent  Anesthetic complications: no  Cardiovascular status: hemodynamically stable  Respiratory status: acceptable  Hydration status: euvolemic    PONV: none

## 2020-12-17 NOTE — DISCHARGE INSTRUCTIONS
ACTIVITY: Rest and take it easy for the first 24 hours.  A responsible adult is recommended to remain with you during that time.  It is normal to feel sleepy.  We encourage you to not do anything that requires balance, judgment or coordination.    MILD FLU-LIKE SYMPTOMS ARE NORMAL. YOU MAY EXPERIENCE GENERALIZED MUSCLE ACHES, THROAT IRRITATION, HEADACHE AND/OR SOME NAUSEA.    FOR 24 HOURS DO NOT:  Drive, operate machinery or run household appliances.  Drink beer or alcoholic beverages.   Make important decisions or sign legal documents.    SPECIAL INSTRUCTIONS: See attached sheet.    Pelvic rest until follow up visit.    DIET: To avoid nausea, slowly advance diet as tolerated, avoiding spicy or greasy foods for the first day.  Add more substantial food to your diet according to your physician's instructions.  Babies can be fed formula or breast milk as soon as they are hungry.  INCREASE FLUIDS AND FIBER TO AVOID CONSTIPATION.    SURGICAL DRESSING/BATHING: Okay to shower after 24 hours. No baths, no hot tubs, and no swimming until cleared by MD.    FOLLOW-UP APPOINTMENT:  A follow-up appointment should be arranged with your doctor; call to schedule.    You should CALL YOUR PHYSICIAN if you develop:  Fever greater than 101 degrees F.  Pain not relieved by medication, or persistent nausea or vomiting.  Excessive bleeding (blood soaking through dressing) or unexpected drainage from the wound.  Extreme redness or swelling around the incision site, drainage of pus or foul smelling drainage.  Inability to urinate or empty your bladder within 8 hours.  Problems with breathing or chest pain.    You should call 911 if you develop problems with breathing or chest pain.  If you are unable to contact your doctor or surgical center, you should go to the nearest emergency room or urgent care center.  Physician's telephone #: [phone]- Dr. Beaulieu.    If any questions arise, call your doctor.  If your doctor is not  available, please feel free to call the Surgical Center at [phone]. The Contact Center is open Monday through Friday 7AM to 5PM and may speak to a nurse at [phone], or toll free at [phone].     A registered nurse may call you a few days after your surgery to see how you are doing after your procedure.    MEDICATIONS: Resume taking daily medication.  Take prescribed pain medication with food.  If no medication is prescribed, you may take non-aspirin pain medication if needed.  PAIN MEDICATION CAN BE VERY CONSTIPATING.  Take a stool softener or laxative such as senokot, pericolace, or milk of magnesia if needed.     Last pain medication given at 3:30 PM (Oxycodone). Okay to take Ibuprofen at 6 pm if needed.     If your physician has prescribed pain medication that includes Acetaminophen (Tylenol), do not take additional Acetaminophen (Tylenol) while taking the prescribed medication.    Depression / Suicide Risk    As you are discharged from this [facility] facility, it is important to learn how to keep safe from harming yourself.    Recognize the warning signs:  · Abrupt changes in personality, positive or negative- including increase in energy   · Giving away possessions  · Change in eating patterns- significant weight changes-  positive or negative  · Change in sleeping patterns- unable to sleep or sleeping all the time   · Unwillingness or inability to communicate  · Depression  · Unusual sadness, discouragement and loneliness  · Talk of wanting to die  · Neglect of personal appearance   · Rebelliousness- reckless behavior  · Withdrawal from people/activities they love  · Confusion- inability to concentrate     If you or a loved one observes any of these behaviors or has concerns about self-harm, here's what you can do:  · Talk about it- your feelings and reasons for harming yourself  · Remove any means that you might use to hurt yourself (examples: pills, rope, extension cords,  firearm)  · Get professional help from the community (Mental Health, Substance Abuse, psychological counseling)  · Do not be alone:Call your Safe Contact- someone whom you trust who will be there for you.  · Call your local CRISIS HOTLINE 550-1602 or 413-154-6120  · Call your local Children's Mobile Crisis Response Team [place] (860) 004-7154 or www.SEElogix  · Call the toll free National Suicide Prevention Hotlines   · National Suicide Prevention Lifeline 934-555-FAHC (1538)  · National Hope Line Network 800-SUICIDE (100-1970)

## 2021-01-04 ENCOUNTER — GYNECOLOGY VISIT (OUTPATIENT)
Dept: OBGYN | Facility: CLINIC | Age: 36
End: 2021-01-04
Payer: COMMERCIAL

## 2021-01-04 VITALS — DIASTOLIC BLOOD PRESSURE: 71 MMHG | SYSTOLIC BLOOD PRESSURE: 114 MMHG | BODY MASS INDEX: 22.96 KG/M2 | WEIGHT: 138 LBS

## 2021-01-04 DIAGNOSIS — N80.9 ENDOMETRIOSIS DETERMINED BY LAPAROSCOPY: ICD-10-CM

## 2021-01-04 PROCEDURE — 99024 POSTOP FOLLOW-UP VISIT: CPT | Performed by: OBSTETRICS & GYNECOLOGY

## 2021-01-04 NOTE — PROGRESS NOTES
Pauline Perrin  35 y.o.  returns to clinic today for postoperative visit following diagnostic laparoscopy on 2020 for ovarian cyst and pelvic pain.  She reports that since being seen last she has been doing well.  She is not longer taking any pain medications.  She is participating in her usual activities but has followed the lifting precautions and has not yet resumed intercourse.  Denies vaginal bleeding, abnormal vaginal discharge, urinary symptoms, bowel complaints, or other concerns at this time.    All other systems are reviewed and are negative.    Findings at the time of surgery were consistent with endometriosis.  Pelvic peritoneum was negative on pathology.    PMH, PSH, SH, and FH reviewed with patient today - changes made in chart.    Vitals:    21 0951   BP: 114/71     General appearance - healthy, alert, no distress  Skin - Skin color, texture, turgor normal. No rashes or lesions.  HEENT: Normocephalic. No masses, lesions, tenderness or abnormalities  Neck - Neck supple. No thyromegaly  Lungs - Lungs clear to auscultation bilaterally  Heart -  RRR without murmur, gallop, or rubs..  Abdomen - Abdomen soft, non-tender. BS normal. No masses,  Incision sites well approximated without any erythema, induration or drainage   Extremities - Extremities normal. No deformities, edema, or skin discoloration    No current facility-administered medications for this visit.      A/P:  Pauline Perrin  35 y.o.  here for post op appt following diagnostic laparoscopy  #Post op.  Meeting all milestones.  Pathology reviewed.   Discussed she may resume intercourse and no longer has any lifting precautions   #Endometriosis.  Discussed that findings at the time of surgery were consistent with peritoneal windows consistent with diagnosis of endometriosis.  Pathology was, however negative.  Patient does have history of painful menses prior to onset of contraceptive use.  Her menses have  been well controlled on pills previously and now with her Mirena IUD.  Advised NSAID use for any breakthrough pain issues or to return to see me so we can discuss other options.  Patient is happy with just using the IUD and occasional NSAIDs right now  #Prior ovarian cyst.  Now resolved as were not present at the time of surgery.  Patient understands warning signs if these were to return and we will repeat imaging if she again develops symptoms.  #Recommend follow up in a year for annual or sooner if issues or concerns arise.  ERIC

## 2021-05-13 ENCOUNTER — OFFICE VISIT (OUTPATIENT)
Dept: MEDICAL GROUP | Facility: MEDICAL CENTER | Age: 36
End: 2021-05-13
Payer: COMMERCIAL

## 2021-05-13 VITALS
DIASTOLIC BLOOD PRESSURE: 72 MMHG | OXYGEN SATURATION: 100 % | SYSTOLIC BLOOD PRESSURE: 110 MMHG | TEMPERATURE: 97.7 F | WEIGHT: 143 LBS | HEIGHT: 60 IN | HEART RATE: 72 BPM | BODY MASS INDEX: 28.07 KG/M2

## 2021-05-13 DIAGNOSIS — H66.013 NON-RECURRENT ACUTE SUPPURATIVE OTITIS MEDIA OF BOTH EARS WITH SPONTANEOUS RUPTURE OF TYMPANIC MEMBRANES: ICD-10-CM

## 2021-05-13 PROBLEM — H92.02 LEFT EAR PAIN: Status: ACTIVE | Noted: 2021-05-13

## 2021-05-13 PROCEDURE — 99214 OFFICE O/P EST MOD 30 MIN: CPT | Performed by: NURSE PRACTITIONER

## 2021-05-13 RX ORDER — FLUTICASONE PROPIONATE 50 MCG
1 SPRAY, SUSPENSION (ML) NASAL DAILY
Qty: 16 G | Refills: 0 | Status: SHIPPED | OUTPATIENT
Start: 2021-05-13 | End: 2022-10-26

## 2021-05-13 RX ORDER — AMOXICILLIN AND CLAVULANATE POTASSIUM 875; 125 MG/1; MG/1
1 TABLET, FILM COATED ORAL 2 TIMES DAILY
Qty: 14 TABLET | Refills: 0 | Status: SHIPPED | OUTPATIENT
Start: 2021-05-13 | End: 2021-05-20

## 2021-05-13 ASSESSMENT — PATIENT HEALTH QUESTIONNAIRE - PHQ9: CLINICAL INTERPRETATION OF PHQ2 SCORE: 0

## 2021-05-13 NOTE — ASSESSMENT & PLAN NOTE
Started last week, mild pain, feels some liquid coming out at night. Having some pain behind ear.     No allergy or cold symptoms. No fevers, chills, body aches, sinus congestion, recent swimming.     Does have a history of recurrent ear infections as a child, nothing for the past several years.

## 2021-05-13 NOTE — PROGRESS NOTES
Subjective:   Pauline Perrin is a 35 y.o. female here today for ear pain    Left ear pain  Started last week, mild pain, feels some liquid coming out at night. Having some pain behind ear.     No allergy or cold symptoms. No fevers, chills, body aches, sinus congestion, recent swimming.     Does have a history of recurrent ear infections as a child, nothing for the past several years.        Current medicines (including changes today)  Current Outpatient Medications   Medication Sig Dispense Refill   • amoxicillin-clavulanate (AUGMENTIN) 875-125 MG Tab Take 1 tablet by mouth 2 times a day for 7 days. 14 tablet 0   • Omega-3 Fatty Acids (FISH OIL) 1000 MG Cap capsule Take 1,000 mg by mouth every day.     • ELDERBERRY PO Take 1 Dose by mouth every day.     • Probiotic Product (PROBIOTIC DAILY) Cap Take  by mouth.       No current facility-administered medications for this visit.     She  has a past medical history of Allergic reaction (8/17/2015), ASTHMA, Migraine (8/16/2018), and Mild intermittent asthma without complication (8/16/2018). She also has no past medical history of Allergy, Headache(784.0), Migraine, or Ulcer.    ROS   No chest pain, no shortness of breath, no abdominal pain  Positive ROS as per HPI.  All other systems reviewed and are negative.     Objective:     /72 (BP Location: Right arm, Patient Position: Sitting, BP Cuff Size: Adult)   Pulse 72   Temp 36.5 °C (97.7 °F) (Temporal)   Ht 1.524 m (5')   Wt 64.9 kg (143 lb)   SpO2 100%  Body mass index is 27.93 kg/m².     Physical Exam:  Constitutional: Alert, no distress.  Skin: Warm, dry, good turgor, no rashes in visible areas.  Eye: Equal, round and reactive, conjunctiva clear, lids normal.  ENMT: Lips without lesions, good dentition, pharyngeal erythema, grade 3 tonsillar hypertrophy, moderate mucosal edema of bilateral nasal turbinates R>L with thick nasal drainage noted. White, cloudy appearance of bilateral TMS, ? Small TM  perforation on left near center.   Neck: Trachea midline, no masses, no thyromegaly. + cervical  lymphadenopathy  Respiratory: Unlabored respiratory effort, lungs clear to auscultation, no wheezes, no ronchi.  Cardiovascular: Normal S1, S2, no murmur, no edema.  Psych: Alert and oriented x3, normal affect and mood.      Assessment and Plan:   The following treatment plan was discussed    1. Non-recurrent acute suppurative otitis media of both ears with spontaneous rupture of tympanic membranes  Unstable  Augmentin BID for 7 days  Use ear plug in shower on left due to possible TM perforation  Flonase daily for 14 days to assist eustachian tube function  - amoxicillin-clavulanate (AUGMENTIN) 875-125 MG Tab; Take 1 tablet by mouth 2 times a day for 7 days.  Dispense: 14 tablet; Refill: 0      Followup: Return if symptoms worsen or fail to improve.    I have placed the below orders and discussed them with an approved delegating provider. The MA is performing the below orders under the direction of Dr. Dillon

## 2021-09-28 ENCOUNTER — OFFICE VISIT (OUTPATIENT)
Dept: URGENT CARE | Facility: CLINIC | Age: 36
End: 2021-09-28
Payer: COMMERCIAL

## 2021-09-28 VITALS
HEIGHT: 60 IN | TEMPERATURE: 97.8 F | HEART RATE: 74 BPM | DIASTOLIC BLOOD PRESSURE: 66 MMHG | SYSTOLIC BLOOD PRESSURE: 110 MMHG | RESPIRATION RATE: 16 BRPM | BODY MASS INDEX: 27.48 KG/M2 | WEIGHT: 140 LBS | OXYGEN SATURATION: 97 %

## 2021-09-28 DIAGNOSIS — Z20.818 STREP THROAT EXPOSURE: ICD-10-CM

## 2021-09-28 DIAGNOSIS — J02.9 SORE THROAT: ICD-10-CM

## 2021-09-28 LAB
INT CON NEG: NORMAL
INT CON POS: NORMAL
S PYO AG THROAT QL: NEGATIVE

## 2021-09-28 PROCEDURE — 87880 STREP A ASSAY W/OPTIC: CPT | Mod: QW | Performed by: PHYSICIAN ASSISTANT

## 2021-09-28 PROCEDURE — 99213 OFFICE O/P EST LOW 20 MIN: CPT | Performed by: PHYSICIAN ASSISTANT

## 2021-09-28 RX ORDER — AMOXICILLIN 500 MG/1
500 CAPSULE ORAL 2 TIMES DAILY
Qty: 20 CAPSULE | Refills: 0 | Status: SHIPPED | OUTPATIENT
Start: 2021-09-28 | End: 2021-10-08

## 2021-09-29 NOTE — PROGRESS NOTES
Subjective     Pauline Perrin is a 35 y.o. female who presents with Sore Throat (Srep Exposure x 3 days, runny nose)    Medications:    • albuterol Aers  • ELDERBERRY PO  • fish oil Caps  • fluticasone  • Probiotic Daily Caps    Allergies: Nkda [no known drug allergy]    Problem List: Pauline Perrin does not have any pertinent problems on file.    Surgical History:  Past Surgical History:   Procedure Laterality Date   • PB LAP,DIAGNOSTIC ABDOMEN  12/16/2020    Procedure: PELVISCOPY;  Surgeon: Clara Beaulieu D.O.;  Location: SURGERY SAME DAY [facility];  Service: Obstetrics       Past Social Hx: Pauline Perrin  reports that she has never smoked. She has never used smokeless tobacco. She reports current alcohol use. She reports that she does not use drugs.     Past Family Hx:  Pauline Perrin family history includes Arthritis in her maternal grandmother; Autoimmune Disease (age of onset: 18) in her sister; Diabetes in her maternal uncle and paternal aunt; Hypertension in her father and mother; No Known Problems in her brother; Stroke (age of onset: 31) in her brother.     Problem list, medications, and allergies reviewed by myself today in Epic.          Patient presents with:  Sore Throat: Srep Exposure x 3 days, runny nose.   Pt has been vaccinated against COVID-19          Pharyngitis   This is a new problem. The current episode started in the past 7 days. The problem has been gradually worsening. Neither side of throat is experiencing more pain than the other. There has been no fever. Associated symptoms include a plugged ear sensation and swollen glands. Pertinent negatives include no congestion, coughing, diarrhea, trouble swallowing or vomiting. She has had exposure to strep. She has tried cool liquids and acetaminophen for the symptoms. The treatment provided no relief.       Review of Systems   Constitutional: Negative for fever.   HENT: Positive for sore throat.  Negative for congestion and trouble swallowing.    Respiratory: Negative for cough.    Gastrointestinal: Negative for diarrhea and vomiting.   Skin: Negative for rash.   All other systems reviewed and are negative.             Objective     /66 (BP Location: Right arm, Patient Position: Sitting, BP Cuff Size: Adult)   Pulse 74   Temp 36.6 °C (97.8 °F) (Temporal)   Resp 16   Ht 1.524 m (5')   Wt 63.5 kg (140 lb)   SpO2 97%   BMI 27.34 kg/m²      Physical Exam  Vitals and nursing note reviewed.   Constitutional:       General: She is not in acute distress.     Appearance: Normal appearance. She is well-developed and normal weight. She is not ill-appearing or toxic-appearing.   HENT:      Head: Normocephalic and atraumatic.      Right Ear: Tympanic membrane normal.      Left Ear: Tympanic membrane normal.      Nose: Nose normal.      Mouth/Throat:      Lips: Pink.      Mouth: Mucous membranes are moist.      Pharynx: Oropharynx is clear. Uvula midline. Posterior oropharyngeal erythema present. No oropharyngeal exudate.   Eyes:      Extraocular Movements: Extraocular movements intact.      Conjunctiva/sclera: Conjunctivae normal.      Pupils: Pupils are equal, round, and reactive to light.   Cardiovascular:      Rate and Rhythm: Normal rate and regular rhythm.      Pulses: Normal pulses.      Heart sounds: Normal heart sounds.   Pulmonary:      Effort: Pulmonary effort is normal.      Breath sounds: Normal breath sounds.   Abdominal:      General: Bowel sounds are normal.      Palpations: Abdomen is soft.   Musculoskeletal:         General: Normal range of motion.      Cervical back: Normal range of motion and neck supple.   Skin:     General: Skin is warm and dry.      Capillary Refill: Capillary refill takes less than 2 seconds.   Neurological:      General: No focal deficit present.      Mental Status: She is alert and oriented to person, place, and time.      Cranial Nerves: No cranial nerve deficit.       Motor: Motor function is intact.      Coordination: Coordination is intact.      Gait: Gait normal.   Psychiatric:         Mood and Affect: Mood normal.             strep :positive                  Assessment & Plan           1. Strep throat exposure  POCT Rapid Strep A    amoxicillin (AMOXIL) 500 MG Cap   2. Sore throat  POCT Rapid Strep A    amoxicillin (AMOXIL) 500 MG Cap     Patient was evaluated in clinic today while wearing appropriate personal protective equipment.      Pt politely declined covid test today.     PT to begin prescription medications today as discussed for strep throat.     Motrin/Advil/Ibuprophen 600 mg every 6 hours as needed for pain or fever.    PT advised saltwater gargles/swishes  3-4 times daily until symptoms improve.     PT should follow up with PCP in 1-2 days for re-evaluation if symptoms have not improved.      Discussed red flags and reasons to return to UC or ED.      Pt and/or family verbalized understanding of diagnosis and follow up instructions and was offered informational handout on diagnosis.  PT discharged.

## 2021-10-02 RX ORDER — SODIUM FLUORIDE 6 MG/ML
PASTE, DENTIFRICE DENTAL
COMMUNITY
Start: 2021-07-24 | End: 2022-10-26

## 2021-10-02 ASSESSMENT — ENCOUNTER SYMPTOMS
TROUBLE SWALLOWING: 0
SORE THROAT: 1
DIARRHEA: 0
COUGH: 0
SWOLLEN GLANDS: 1
VOMITING: 0
FEVER: 0

## 2022-05-19 ENCOUNTER — OFFICE VISIT (OUTPATIENT)
Dept: MEDICAL GROUP | Facility: MEDICAL CENTER | Age: 37
End: 2022-05-19
Payer: COMMERCIAL

## 2022-05-19 ENCOUNTER — TELEPHONE (OUTPATIENT)
Dept: MEDICAL GROUP | Facility: MEDICAL CENTER | Age: 37
End: 2022-05-19

## 2022-05-19 ENCOUNTER — HOSPITAL ENCOUNTER (OUTPATIENT)
Dept: LAB | Facility: MEDICAL CENTER | Age: 37
End: 2022-05-19
Attending: INTERNAL MEDICINE
Payer: COMMERCIAL

## 2022-05-19 VITALS
HEIGHT: 60 IN | OXYGEN SATURATION: 96 % | TEMPERATURE: 98.1 F | HEART RATE: 93 BPM | WEIGHT: 152 LBS | SYSTOLIC BLOOD PRESSURE: 120 MMHG | DIASTOLIC BLOOD PRESSURE: 72 MMHG | BODY MASS INDEX: 29.84 KG/M2

## 2022-05-19 DIAGNOSIS — Z00.00 PREVENTATIVE HEALTH CARE: ICD-10-CM

## 2022-05-19 DIAGNOSIS — R10.9 ABDOMINAL PAIN, UNSPECIFIED ABDOMINAL LOCATION: ICD-10-CM

## 2022-05-19 DIAGNOSIS — R11.0 NAUSEA: ICD-10-CM

## 2022-05-19 DIAGNOSIS — Z97.5 IUD (INTRAUTERINE DEVICE) IN PLACE: ICD-10-CM

## 2022-05-19 DIAGNOSIS — J45.20 MILD INTERMITTENT ASTHMA WITHOUT COMPLICATION: ICD-10-CM

## 2022-05-19 LAB
ALBUMIN SERPL BCP-MCNC: 4.1 G/DL (ref 3.2–4.9)
ALBUMIN/GLOB SERPL: 1.4 G/DL
ALP SERPL-CCNC: 51 U/L (ref 30–99)
ALT SERPL-CCNC: 24 U/L (ref 2–50)
AMYLASE SERPL-CCNC: 87 U/L (ref 20–103)
ANION GAP SERPL CALC-SCNC: 11 MMOL/L (ref 7–16)
AST SERPL-CCNC: 22 U/L (ref 12–45)
BASOPHILS # BLD AUTO: 0.4 % (ref 0–1.8)
BASOPHILS # BLD: 0.04 K/UL (ref 0–0.12)
BILIRUB SERPL-MCNC: 0.4 MG/DL (ref 0.1–1.5)
BUN SERPL-MCNC: 12 MG/DL (ref 8–22)
CALCIUM SERPL-MCNC: 9.3 MG/DL (ref 8.5–10.5)
CHLORIDE SERPL-SCNC: 102 MMOL/L (ref 96–112)
CO2 SERPL-SCNC: 22 MMOL/L (ref 20–33)
CREAT SERPL-MCNC: 0.75 MG/DL (ref 0.5–1.4)
EOSINOPHIL # BLD AUTO: 0.1 K/UL (ref 0–0.51)
EOSINOPHIL NFR BLD: 1 % (ref 0–6.9)
ERYTHROCYTE [DISTWIDTH] IN BLOOD BY AUTOMATED COUNT: 41.3 FL (ref 35.9–50)
EST. AVERAGE GLUCOSE BLD GHB EST-MCNC: 105 MG/DL
GFR SERPLBLD CREATININE-BSD FMLA CKD-EPI: 107 ML/MIN/1.73 M 2
GLOBULIN SER CALC-MCNC: 3 G/DL (ref 1.9–3.5)
GLUCOSE SERPL-MCNC: 89 MG/DL (ref 65–99)
HBA1C MFR BLD: 5.3 % (ref 4–5.6)
HCG SERPL QL: NEGATIVE
HCT VFR BLD AUTO: 39.5 % (ref 37–47)
HGB BLD-MCNC: 13.6 G/DL (ref 12–16)
IMM GRANULOCYTES # BLD AUTO: 0.06 K/UL (ref 0–0.11)
IMM GRANULOCYTES NFR BLD AUTO: 0.6 % (ref 0–0.9)
INT CON NEG: NORMAL
INT CON POS: NORMAL
LIPASE SERPL-CCNC: 94 U/L (ref 11–82)
LYMPHOCYTES # BLD AUTO: 2.12 K/UL (ref 1–4.8)
LYMPHOCYTES NFR BLD: 21.6 % (ref 22–41)
MCH RBC QN AUTO: 31.7 PG (ref 27–33)
MCHC RBC AUTO-ENTMCNC: 34.4 G/DL (ref 33.6–35)
MCV RBC AUTO: 92.1 FL (ref 81.4–97.8)
MONOCYTES # BLD AUTO: 0.69 K/UL (ref 0–0.85)
MONOCYTES NFR BLD AUTO: 7 % (ref 0–13.4)
NEUTROPHILS # BLD AUTO: 6.81 K/UL (ref 2–7.15)
NEUTROPHILS NFR BLD: 69.4 % (ref 44–72)
NRBC # BLD AUTO: 0 K/UL
NRBC BLD-RTO: 0 /100 WBC
PLATELET # BLD AUTO: 275 K/UL (ref 164–446)
PMV BLD AUTO: 10.9 FL (ref 9–12.9)
POC URINE PREGNANCY TEST: NORMAL
POTASSIUM SERPL-SCNC: 4.3 MMOL/L (ref 3.6–5.5)
PROT SERPL-MCNC: 7.1 G/DL (ref 6–8.2)
RBC # BLD AUTO: 4.29 M/UL (ref 4.2–5.4)
SODIUM SERPL-SCNC: 135 MMOL/L (ref 135–145)
TSH SERPL DL<=0.005 MIU/L-ACNC: 2.07 UIU/ML (ref 0.38–5.33)
WBC # BLD AUTO: 9.8 K/UL (ref 4.8–10.8)

## 2022-05-19 PROCEDURE — 82306 VITAMIN D 25 HYDROXY: CPT

## 2022-05-19 PROCEDURE — 84443 ASSAY THYROID STIM HORMONE: CPT

## 2022-05-19 PROCEDURE — 82150 ASSAY OF AMYLASE: CPT

## 2022-05-19 PROCEDURE — 80053 COMPREHEN METABOLIC PANEL: CPT

## 2022-05-19 PROCEDURE — 83690 ASSAY OF LIPASE: CPT

## 2022-05-19 PROCEDURE — 85025 COMPLETE CBC W/AUTO DIFF WBC: CPT

## 2022-05-19 PROCEDURE — 36415 COLL VENOUS BLD VENIPUNCTURE: CPT

## 2022-05-19 PROCEDURE — 84703 CHORIONIC GONADOTROPIN ASSAY: CPT

## 2022-05-19 PROCEDURE — 81025 URINE PREGNANCY TEST: CPT | Performed by: INTERNAL MEDICINE

## 2022-05-19 PROCEDURE — 99214 OFFICE O/P EST MOD 30 MIN: CPT | Performed by: INTERNAL MEDICINE

## 2022-05-19 PROCEDURE — 83036 HEMOGLOBIN GLYCOSYLATED A1C: CPT

## 2022-05-19 ASSESSMENT — PATIENT HEALTH QUESTIONNAIRE - PHQ9: CLINICAL INTERPRETATION OF PHQ2 SCORE: 0
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
## 2022-05-20 NOTE — TELEPHONE ENCOUNTER
Notified with labs, pregnancy serology beta hCG negative.  Normal liver enzymes, mildly elevated lipase, no risk factors for pancreatitis, CBC normal, calcium, renal function all normal.  Ultrasound right upper quadrant pending, she will call to have that scheduled.  Otherwise remainder of labs unremarkable.

## 2022-05-21 LAB — 25(OH)D3 SERPL-MCNC: 16 NG/ML (ref 30–80)

## 2022-05-23 ENCOUNTER — TELEPHONE (OUTPATIENT)
Dept: MEDICAL GROUP | Facility: MEDICAL CENTER | Age: 37
End: 2022-05-23
Payer: COMMERCIAL

## 2022-05-23 NOTE — TELEPHONE ENCOUNTER
----- Message from Felix Dillon M.D. sent at 5/21/2022  3:33 PM PDT -----  Please notify Chhaya's patient that her vitamin D is low at 16, have her take over-the-counter vitamin D 5000 units daily.

## 2022-06-14 ENCOUNTER — APPOINTMENT (OUTPATIENT)
Dept: RADIOLOGY | Facility: MEDICAL CENTER | Age: 37
End: 2022-06-14
Attending: INTERNAL MEDICINE
Payer: COMMERCIAL

## 2022-07-11 ENCOUNTER — HOSPITAL ENCOUNTER (OUTPATIENT)
Dept: RADIOLOGY | Facility: MEDICAL CENTER | Age: 37
End: 2022-07-11
Attending: INTERNAL MEDICINE
Payer: COMMERCIAL

## 2022-07-11 DIAGNOSIS — R11.0 NAUSEA: ICD-10-CM

## 2022-07-11 PROCEDURE — 76705 ECHO EXAM OF ABDOMEN: CPT

## 2022-07-12 ENCOUNTER — TELEPHONE (OUTPATIENT)
Dept: MEDICAL GROUP | Facility: MEDICAL CENTER | Age: 37
End: 2022-07-12
Payer: COMMERCIAL

## 2022-07-12 NOTE — TELEPHONE ENCOUNTER
----- Message from Felix Dillon M.D. sent at 7/11/2022 12:23 PM PDT -----  Please notify Chhaya's patient that her liver ultrasound is negative, there is no gallbladder, pancreas, or liver inflammation.

## 2022-10-26 ENCOUNTER — OFFICE VISIT (OUTPATIENT)
Dept: MEDICAL GROUP | Facility: MEDICAL CENTER | Age: 37
End: 2022-10-26
Payer: COMMERCIAL

## 2022-10-26 VITALS
TEMPERATURE: 98.6 F | SYSTOLIC BLOOD PRESSURE: 120 MMHG | DIASTOLIC BLOOD PRESSURE: 82 MMHG | OXYGEN SATURATION: 99 % | WEIGHT: 151 LBS | HEART RATE: 81 BPM | BODY MASS INDEX: 29.64 KG/M2 | HEIGHT: 60 IN

## 2022-10-26 DIAGNOSIS — R21 RASH: ICD-10-CM

## 2022-10-26 DIAGNOSIS — R63.5 WEIGHT GAIN: ICD-10-CM

## 2022-10-26 DIAGNOSIS — L71.0 PERIORAL DERMATITIS: ICD-10-CM

## 2022-10-26 PROCEDURE — 99214 OFFICE O/P EST MOD 30 MIN: CPT | Performed by: NURSE PRACTITIONER

## 2022-10-26 RX ORDER — CLOTRIMAZOLE AND BETAMETHASONE DIPROPIONATE 10; .64 MG/G; MG/G
1 CREAM TOPICAL 2 TIMES DAILY
Qty: 15 G | Refills: 1 | Status: SHIPPED | OUTPATIENT
Start: 2022-10-26 | End: 2022-11-09 | Stop reason: SDUPTHER

## 2022-10-26 RX ORDER — ALBUTEROL SULFATE 90 UG/1
2 AEROSOL, METERED RESPIRATORY (INHALATION) EVERY 6 HOURS PRN
Qty: 8.5 G | Refills: 3 | COMMUNITY
Start: 2022-10-26 | End: 2022-11-09 | Stop reason: SDUPTHER

## 2022-10-26 ASSESSMENT — FIBROSIS 4 INDEX: FIB4 SCORE: 0.56

## 2022-10-26 NOTE — ASSESSMENT & PLAN NOTE
Started Dec 2019 with rash around entire nose and going to chin, but sparing upper lip. There is associated papules and redness, with itching.     Prescribed Lotrisone cream for this in the past which worked well. Hasn't had a flare in 2 years until recently. She restarted cream but has run out, requesting refill today.

## 2022-10-26 NOTE — ASSESSMENT & PLAN NOTE
Has gained about 20 pounds over the past 2 years. Diet hasn't changed, she continues to workout 3 days per week at least. Weights, aerobic exercise.     Breakfast: Egg sandwich with wheat bread, smoothie  Lunch: leftovers or salad  Dinner: Chicken, steak, tofu, Airfryer or oven. Limits carbs.     Stopped drinking milk, now drinking almond milk. Coffee with creamer. Soda on weekend, 2 cans.     Sweets once weekly.

## 2022-10-28 ENCOUNTER — PATIENT MESSAGE (OUTPATIENT)
Dept: MEDICAL GROUP | Facility: MEDICAL CENTER | Age: 37
End: 2022-10-28
Payer: COMMERCIAL

## 2022-10-28 DIAGNOSIS — L71.0 PERIORAL DERMATITIS: ICD-10-CM

## 2022-11-10 RX ORDER — CLOTRIMAZOLE AND BETAMETHASONE DIPROPIONATE 10; .64 MG/G; MG/G
1 CREAM TOPICAL 2 TIMES DAILY
Qty: 15 G | Refills: 1 | Status: SHIPPED | OUTPATIENT
Start: 2022-11-10

## 2022-11-10 RX ORDER — ALBUTEROL SULFATE 90 UG/1
2 AEROSOL, METERED RESPIRATORY (INHALATION) EVERY 6 HOURS PRN
Qty: 8.5 G | Refills: 3 | Status: SHIPPED | OUTPATIENT
Start: 2022-11-10

## 2022-11-14 ENCOUNTER — HOSPITAL ENCOUNTER (OUTPATIENT)
Dept: LAB | Facility: MEDICAL CENTER | Age: 37
End: 2022-11-14
Attending: NURSE PRACTITIONER
Payer: COMMERCIAL

## 2022-11-14 DIAGNOSIS — R63.5 WEIGHT GAIN: ICD-10-CM

## 2022-11-14 LAB — TSH SERPL DL<=0.005 MIU/L-ACNC: 1.78 UIU/ML (ref 0.38–5.33)

## 2022-11-14 PROCEDURE — 84403 ASSAY OF TOTAL TESTOSTERONE: CPT

## 2022-11-14 PROCEDURE — 84270 ASSAY OF SEX HORMONE GLOBUL: CPT

## 2022-11-14 PROCEDURE — 36415 COLL VENOUS BLD VENIPUNCTURE: CPT

## 2022-11-14 PROCEDURE — 84443 ASSAY THYROID STIM HORMONE: CPT

## 2022-11-14 PROCEDURE — 84402 ASSAY OF FREE TESTOSTERONE: CPT

## 2022-11-16 NOTE — PROGRESS NOTES
Subjective:   Pauline Perrin is a 36 y.o. female here today for rash, weight gain    Perioral dermatitis  Started Dec 2019 with rash around entire nose and going to chin, but sparing upper lip. There is associated papules and redness, with itching.     Prescribed Lotrisone cream for this in the past which worked well. Hasn't had a flare in 2 years until recently. She restarted cream but has run out, requesting refill today.     Weight gain  Has gained about 20 pounds over the past 2 years. Diet hasn't changed, she continues to workout 3 days per week at least. Weights, aerobic exercise.     Breakfast: Egg sandwich with wheat bread, smoothie  Lunch: leftovers or salad  Dinner: Chicken, steak, tofu, Airfryer or oven. Limits carbs.     Stopped drinking milk, now drinking almond milk. Coffee with creamer. Soda on weekend, 2 cans.     Sweets once weekly.        Current medicines (including changes today)  Current Outpatient Medications   Medication Sig Dispense Refill    albuterol 108 (90 Base) MCG/ACT Aero Soln inhalation aerosol Inhale 2 Puffs every 6 hours as needed for Shortness of Breath. 8.5 g 3    clotrimazole-betamethasone (LOTRISONE) 1-0.05 % Cream Apply 1 Application topically 2 times a day. Apply thin layer very sparingly. 2 weeks on, 1 week off. 15 g 1     No current facility-administered medications for this visit.     She  has a past medical history of Allergic reaction (8/17/2015), ASTHMA, Migraine (8/16/2018), and Mild intermittent asthma without complication (8/16/2018).    She has no past medical history of Allergy, Headache(784.0), Migraine, or Ulcer.    ROS   No chest pain, no shortness of breath, no abdominal pain  Positive ROS as per HPI.  All other systems reviewed and are negative.     Objective:     /82 (BP Location: Right arm, Patient Position: Sitting, BP Cuff Size: Adult)   Pulse 81   Temp 37 °C (98.6 °F) (Temporal)   Ht 1.524 m (5')   Wt 68.5 kg (151 lb)   SpO2 99%  Body  mass index is 29.49 kg/m².     Physical Exam:  Constitutional: Alert, no distress.  Skin: Warm, dry, good turgor, no rashes in visible areas.  Eye: Equal, round and reactive, conjunctiva clear, lids normal.  ENMT: Mask in place  Respiratory: Unlabored respiratory effort  Psych: Alert and oriented x3, normal affect and mood.        Assessment and Plan:   The following treatment plan was discussed    1. Perioral dermatitis  Unstable  Trial Lotrisone cream twice daily    2. Rash  As above    3. Weight gain  Unstable  Diet and activity modifications discussed in depth  Check labs  - TSH WITH REFLEX TO FT4; Future  - TESTOSTERONE F&T FEMALES/CHILD; Future      Followup: Return if symptoms worsen or fail to improve.    The MA is performing the above orders under the direction of Dr. Dillon    Please note that this dictation was created using voice recognition software. I have made every reasonable attempt to correct obvious errors, but I expect that there are errors of grammar and possibly content that I did not discover before finalizing the note.

## 2022-11-19 LAB
SHBG SERPL-SCNC: 50 NMOL/L (ref 25–122)
TESTOST FREE SERPL-MCNC: 3.6 PG/ML (ref 1.3–9.2)
TESTOST SERPL-MCNC: 28 NG/DL (ref 9–55)

## 2023-01-26 ENCOUNTER — OFFICE VISIT (OUTPATIENT)
Dept: MEDICAL GROUP | Facility: MEDICAL CENTER | Age: 38
End: 2023-01-26
Payer: COMMERCIAL

## 2023-01-26 VITALS
HEIGHT: 60 IN | TEMPERATURE: 97.7 F | OXYGEN SATURATION: 99 % | HEART RATE: 81 BPM | SYSTOLIC BLOOD PRESSURE: 106 MMHG | DIASTOLIC BLOOD PRESSURE: 70 MMHG | WEIGHT: 152.4 LBS | BODY MASS INDEX: 29.92 KG/M2

## 2023-01-26 DIAGNOSIS — L21.9 SEBORRHEIC DERMATITIS: ICD-10-CM

## 2023-01-26 PROCEDURE — 99214 OFFICE O/P EST MOD 30 MIN: CPT | Performed by: NURSE PRACTITIONER

## 2023-01-26 RX ORDER — DOXYCYCLINE HYCLATE 100 MG
100 TABLET ORAL 2 TIMES DAILY
Qty: 60 TABLET | Refills: 0 | Status: SHIPPED | OUTPATIENT
Start: 2023-01-26 | End: 2023-02-25

## 2023-01-26 RX ORDER — CLOTRIMAZOLE AND BETAMETHASONE DIPROPIONATE 10; .64 MG/G; MG/G
1 CREAM TOPICAL 2 TIMES DAILY
Qty: 15 G | Refills: 1 | Status: CANCELLED | OUTPATIENT
Start: 2023-01-26

## 2023-01-26 RX ORDER — KETOCONAZOLE 20 MG/G
1 CREAM TOPICAL 2 TIMES DAILY
Qty: 60 G | Refills: 1 | Status: SHIPPED | OUTPATIENT
Start: 2023-01-26

## 2023-01-26 ASSESSMENT — FIBROSIS 4 INDEX: FIB4 SCORE: 0.57

## 2023-01-26 NOTE — NON-PROVIDER
No chief complaint on file.      Subjective:     HPI:   Pauline Perrin is a 37 y.o. female here to discuss the evaluation and management of:      Seborrheic dermatitis    Chronic condition.  Onset of symptoms started Dec 2019 with rash around entire nose and going to chin, right cheek and bilateral eyelids but sparing upper lip. There is associated  redness and itching.      Prescribed Lotrisone cream for this in the past which worked well. Hasn't had a flare in 2 years until recently. She restarted cream but has run out, requesting refill today. patient reports use of cream successful for relief of symptoms but as soon as she stops using Lotrisone cream, symptoms return within 24 hours.    Patient states she was prescribed per Dr Ojeda doxycycline 100mg tabs BID for 30 days and symptoms resolved for many months.            ROS  Gen: no fevers/chills, no changes in weight  Pulm: no sob, no cough  CV: no chest pain, no palpitations  GI: no nausea/vomiting, no diarrhea  MSk: no myalgias  Neuro: no headaches, no numbness/tingling  Skin: itchy, redness to right cheek, upper eyelids.      Allergies   Allergen Reactions    Nkda [No Known Drug Allergy]        Current medicines (including changes today)  Current Outpatient Medications   Medication Sig Dispense Refill    albuterol 108 (90 Base) MCG/ACT Aero Soln inhalation aerosol Inhale 2 Puffs every 6 hours as needed for Shortness of Breath. 8.5 g 3    clotrimazole-betamethasone (LOTRISONE) 1-0.05 % Cream Apply 1 Application topically 2 times a day. Apply thin layer very sparingly. 2 weeks on, 1 week off. 15 g 1     No current facility-administered medications for this visit.          Objective:       /70 (BP Location: Left arm, Patient Position: Sitting, BP Cuff Size: Adult)   Pulse 81   Temp 36.5 °C (97.7 °F) (Temporal)   Ht 1.524 m (5')   Wt 69.1 kg (152 lb 6.4 oz)   SpO2 99%  Body mass index is 29.76 kg/m².    Physical Exam:  Constitutional:  Well-developed and well-nourished female in NAD. Not diaphoretic. No distress.   Skin: warm, dry, intact. Redness noted to cheeks with   Cardiovascular: Regular rate and rhythm without murmur. Radial pulses are intact and equal bilaterally.  Pulmonary: Clear to ausculation. Normal effort. No rales, ronchi, or wheezing.  Abdomen: Soft, non tender, and without distention. Active bowel sounds in all four quadrants.   Extremities: No cyanosis, clubbing, erythema, nor edema.   Neurological: Alert and oriented x 3.   Psychiatric:  Behavior, mood, and affect are appropriate.      Assessment and Plan:     The following treatment plan was discussed:  I have reviewed labs with patient and answered all questions.    Seborrheic dermatitis  Chronic condition, acute exacerbation. Patient to stop using clotrimazole-betamethasone 1-0.05% cream. Patient to start using ketoconazole 2% cream to affected facial areas 2 times daily. Patient to follow up with dermatologist, initial appointment scheduled for 02-01-23.   - doxycycline (VIBRAMYCIN) 100 MG Tab; Take 1 Tablet by mouth 2 times a day for 30 days.  Dispense: 60 Tablet; Refill: 0  - ketoconazole (NIZORAL) 2 % Cream; Apply 1 Application topically 2 times a day.  Dispense: 60 g; Refill: 1  - Referral to Dermatology     Any change or worsening of signs or symptoms, patient encouraged to follow-up or report to urgent care or emergency room for further evaluation. Patient verbalizes understanding and agrees.    Follow-Up: No follow-ups on file.      PLEASE NOTE: This dictation was created using voice recognition software. I have made every reasonable attempt to correct obvious errors, but I expect that there are errors of grammar and possibly content that I did not discover before finalizing the note.

## 2023-01-27 NOTE — PROGRESS NOTES
Subjective:   Pauline Perrin is a 37 y.o. female here today for facial rash    Seborrheic dermatitis  Chronic condition.  Onset of symptoms started Dec 2019 with rash around entire nose and going to chin, right cheek and bilateral eyelids but sparing upper lip. There is associated  redness and itching.      Prescribed Lotrisone cream for this in the past which worked well. Hasn't had a flare in 2 years until recently. She restarted cream but has run out, requesting refill today. patient reports use of cream successful for relief of symptoms but as soon as she stops using Lotrisone cream, symptoms return within 24 hours.     Patient states she was prescribed per Dr Ojeda doxycycline 100mg tabs BID for 30 days and symptoms resolved for 2 years.          Current medicines (including changes today)  Current Outpatient Medications   Medication Sig Dispense Refill    doxycycline (VIBRAMYCIN) 100 MG Tab Take 1 Tablet by mouth 2 times a day for 30 days. 60 Tablet 0    ketoconazole (NIZORAL) 2 % Cream Apply 1 Application topically 2 times a day. 60 g 1    albuterol 108 (90 Base) MCG/ACT Aero Soln inhalation aerosol Inhale 2 Puffs every 6 hours as needed for Shortness of Breath. 8.5 g 3    clotrimazole-betamethasone (LOTRISONE) 1-0.05 % Cream Apply 1 Application topically 2 times a day. Apply thin layer very sparingly. 2 weeks on, 1 week off. 15 g 1     No current facility-administered medications for this visit.     She  has a past medical history of Allergic reaction (8/17/2015), ASTHMA, Migraine (8/16/2018), and Mild intermittent asthma without complication (8/16/2018).    She has no past medical history of Allergy, Headache(784.0), Migraine, or Ulcer.    ROS   No chest pain, no shortness of breath, no abdominal pain  Positive ROS as per HPI.  All other systems reviewed and are negative.     Objective:     /70 (BP Location: Left arm, Patient Position: Sitting, BP Cuff Size: Adult)   Pulse 81   Temp 36.5 °C  (97.7 °F) (Temporal)   Ht 1.524 m (5')   Wt 69.1 kg (152 lb 6.4 oz)   SpO2 99%  Body mass index is 29.76 kg/m².     Physical Exam:  Constitutional: Alert, no distress.  Skin: Warm, dry, good turgor, scattered papules with underlying erythema bilateral cheeks, nasolabial folds, upper lids.   Eye: Equal, round and reactive, conjunctiva clear, lids normal.  ENMT: Mask in place  Respiratory: Unlabored respiratory effort  Psych: Alert and oriented x3, normal affect and mood.        Assessment and Plan:   The following treatment plan was discussed    1. Seborrheic dermatitis  Unstable  Stop lotrisone, switch to ketoconazole as she has been using steroid cream for too long  Trial doxy 100 mg BID for 30 days  Follow up with derm if not improved  - doxycycline (VIBRAMYCIN) 100 MG Tab; Take 1 Tablet by mouth 2 times a day for 30 days.  Dispense: 60 Tablet; Refill: 0  - ketoconazole (NIZORAL) 2 % Cream; Apply 1 Application topically 2 times a day.  Dispense: 60 g; Refill: 1  - Referral to Dermatology      Followup: Return if symptoms worsen or fail to improve.    The MA is performing the above orders under the direction of Dr. Dillon    Please note that this dictation was created using voice recognition software. I have made every reasonable attempt to correct obvious errors, but I expect that there are errors of grammar and possibly content that I did not discover before finalizing the note.

## 2023-01-27 NOTE — ASSESSMENT & PLAN NOTE
Chronic condition.  Onset of symptoms started Dec 2019 with rash around entire nose and going to chin, right cheek and bilateral eyelids but sparing upper lip. There is associated  redness and itching.      Prescribed Lotrisone cream for this in the past which worked well. Hasn't had a flare in 2 years until recently. She restarted cream but has run out, requesting refill today. patient reports use of cream successful for relief of symptoms but as soon as she stops using Lotrisone cream, symptoms return within 24 hours.     Patient states she was prescribed per Dr Ojeda doxycycline 100mg tabs BID for 30 days and symptoms resolved for 2 years.

## 2023-02-01 ENCOUNTER — APPOINTMENT (RX ONLY)
Dept: URBAN - METROPOLITAN AREA CLINIC 22 | Facility: CLINIC | Age: 38
Setting detail: DERMATOLOGY
End: 2023-02-01

## 2023-02-01 DIAGNOSIS — L81.1 CHLOASMA: ICD-10-CM

## 2023-02-01 DIAGNOSIS — L71.0 PERIORAL DERMATITIS: ICD-10-CM | Status: RESOLVING

## 2023-02-01 DIAGNOSIS — D22 MELANOCYTIC NEVI: ICD-10-CM

## 2023-02-01 DIAGNOSIS — L82.1 OTHER SEBORRHEIC KERATOSIS: ICD-10-CM

## 2023-02-01 DIAGNOSIS — L81.4 OTHER MELANIN HYPERPIGMENTATION: ICD-10-CM

## 2023-02-01 DIAGNOSIS — Z71.89 OTHER SPECIFIED COUNSELING: ICD-10-CM

## 2023-02-01 PROBLEM — D22.5 MELANOCYTIC NEVI OF TRUNK: Status: ACTIVE | Noted: 2023-02-01

## 2023-02-01 PROBLEM — D22.62 MELANOCYTIC NEVI OF LEFT UPPER LIMB, INCLUDING SHOULDER: Status: ACTIVE | Noted: 2023-02-01

## 2023-02-01 PROBLEM — D22.61 MELANOCYTIC NEVI OF RIGHT UPPER LIMB, INCLUDING SHOULDER: Status: ACTIVE | Noted: 2023-02-01

## 2023-02-01 PROCEDURE — ? PRESCRIPTION

## 2023-02-01 PROCEDURE — ? TREATMENT REGIMEN

## 2023-02-01 PROCEDURE — ? COUNSELING

## 2023-02-01 PROCEDURE — 99203 OFFICE O/P NEW LOW 30 MIN: CPT

## 2023-02-01 PROCEDURE — ? SUNSCREEN TREATMENT REGIMEN

## 2023-02-01 RX ORDER — H-QUINONE/TRETINOIN/HYDROCORT 4 %-0.025%
1 EMULSION (GRAM) TOPICAL QHS
Qty: 30 | Refills: 3 | Status: ERX | COMMUNITY
Start: 2023-02-01

## 2023-02-01 RX ADMIN — Medication 1: at 00:00

## 2023-02-01 ASSESSMENT — LOCATION DETAILED DESCRIPTION DERM
LOCATION DETAILED: LEFT PROXIMAL POSTERIOR UPPER ARM
LOCATION DETAILED: LEFT UPPER CUTANEOUS LIP
LOCATION DETAILED: INFERIOR MID FOREHEAD
LOCATION DETAILED: INFERIOR THORACIC SPINE
LOCATION DETAILED: LEFT CENTRAL MALAR CHEEK
LOCATION DETAILED: RIGHT PROXIMAL POSTERIOR UPPER ARM
LOCATION DETAILED: RIGHT UPPER CUTANEOUS LIP
LOCATION DETAILED: RIGHT CENTRAL MALAR CHEEK
LOCATION DETAILED: RIGHT SUPERIOR MEDIAL MIDBACK
LOCATION DETAILED: SUPERIOR THORACIC SPINE
LOCATION DETAILED: RIGHT VENTRAL PROXIMAL FOREARM
LOCATION DETAILED: RIGHT SUPERIOR MEDIAL FOREHEAD
LOCATION DETAILED: LEFT VENTRAL PROXIMAL FOREARM
LOCATION DETAILED: SUBXIPHOID

## 2023-02-01 ASSESSMENT — LOCATION ZONE DERM
LOCATION ZONE: ARM
LOCATION ZONE: FACE
LOCATION ZONE: LIP
LOCATION ZONE: TRUNK

## 2023-02-01 ASSESSMENT — LOCATION SIMPLE DESCRIPTION DERM
LOCATION SIMPLE: LEFT CHEEK
LOCATION SIMPLE: RIGHT LIP
LOCATION SIMPLE: RIGHT FOREHEAD
LOCATION SIMPLE: ABDOMEN
LOCATION SIMPLE: LEFT POSTERIOR UPPER ARM
LOCATION SIMPLE: RIGHT POSTERIOR UPPER ARM
LOCATION SIMPLE: RIGHT CHEEK
LOCATION SIMPLE: RIGHT LOWER BACK
LOCATION SIMPLE: LEFT FOREARM
LOCATION SIMPLE: INFERIOR FOREHEAD
LOCATION SIMPLE: UPPER BACK
LOCATION SIMPLE: LEFT LIP
LOCATION SIMPLE: RIGHT FOREARM

## 2023-02-01 NOTE — PROCEDURE: TREATMENT REGIMEN
Discontinue Regimen: Ketoconazole cream
Detail Level: Simple
Continue Regimen: Course of Doxycycline twice a day for a month, as prescribed by pcp

## 2023-08-24 ENCOUNTER — OFFICE VISIT (OUTPATIENT)
Dept: MEDICAL GROUP | Facility: MEDICAL CENTER | Age: 38
End: 2023-08-24
Payer: COMMERCIAL

## 2023-08-24 ENCOUNTER — HOSPITAL ENCOUNTER (OUTPATIENT)
Facility: MEDICAL CENTER | Age: 38
End: 2023-08-24
Attending: NURSE PRACTITIONER
Payer: COMMERCIAL

## 2023-08-24 VITALS
DIASTOLIC BLOOD PRESSURE: 78 MMHG | HEIGHT: 60 IN | OXYGEN SATURATION: 98 % | TEMPERATURE: 97.2 F | SYSTOLIC BLOOD PRESSURE: 118 MMHG | WEIGHT: 150 LBS | BODY MASS INDEX: 29.45 KG/M2 | HEART RATE: 69 BPM

## 2023-08-24 DIAGNOSIS — Z00.00 ANNUAL PHYSICAL EXAM: ICD-10-CM

## 2023-08-24 DIAGNOSIS — Z12.4 PAP SMEAR FOR CERVICAL CANCER SCREENING: ICD-10-CM

## 2023-08-24 DIAGNOSIS — Z01.84 IMMUNITY STATUS TESTING: ICD-10-CM

## 2023-08-24 DIAGNOSIS — E55.9 VITAMIN D DEFICIENCY: ICD-10-CM

## 2023-08-24 DIAGNOSIS — M79.645 PAIN OF FINGER OF LEFT HAND: ICD-10-CM

## 2023-08-24 DIAGNOSIS — Z11.59 NEED FOR HEPATITIS C SCREENING TEST: ICD-10-CM

## 2023-08-24 DIAGNOSIS — Z23 NEED FOR VACCINATION: ICD-10-CM

## 2023-08-24 LAB — AMBIGUOUS DTTM AMBI4: NORMAL

## 2023-08-24 PROCEDURE — 99395 PREV VISIT EST AGE 18-39: CPT | Mod: 25 | Performed by: NURSE PRACTITIONER

## 2023-08-24 PROCEDURE — 90677 PCV20 VACCINE IM: CPT | Performed by: NURSE PRACTITIONER

## 2023-08-24 PROCEDURE — 90715 TDAP VACCINE 7 YRS/> IM: CPT | Performed by: NURSE PRACTITIONER

## 2023-08-24 PROCEDURE — 88175 CYTOPATH C/V AUTO FLUID REDO: CPT

## 2023-08-24 PROCEDURE — 87591 N.GONORRHOEAE DNA AMP PROB: CPT

## 2023-08-24 PROCEDURE — 3078F DIAST BP <80 MM HG: CPT | Performed by: NURSE PRACTITIONER

## 2023-08-24 PROCEDURE — 90471 IMMUNIZATION ADMIN: CPT | Performed by: NURSE PRACTITIONER

## 2023-08-24 PROCEDURE — 87491 CHLMYD TRACH DNA AMP PROBE: CPT

## 2023-08-24 PROCEDURE — 87624 HPV HI-RISK TYP POOLED RSLT: CPT

## 2023-08-24 PROCEDURE — 90472 IMMUNIZATION ADMIN EACH ADD: CPT | Performed by: NURSE PRACTITIONER

## 2023-08-24 PROCEDURE — 3074F SYST BP LT 130 MM HG: CPT | Performed by: NURSE PRACTITIONER

## 2023-08-24 PROCEDURE — 99213 OFFICE O/P EST LOW 20 MIN: CPT | Mod: 25 | Performed by: NURSE PRACTITIONER

## 2023-08-24 ASSESSMENT — PATIENT HEALTH QUESTIONNAIRE - PHQ9: CLINICAL INTERPRETATION OF PHQ2 SCORE: 0

## 2023-08-24 ASSESSMENT — FIBROSIS 4 INDEX: FIB4 SCORE: 0.57

## 2023-08-24 NOTE — PROGRESS NOTES
SUBJECTIVE: 37 y.o. female for annual routine gynecologic exam  Chief Complaint   Patient presents with    Gynecologic Exam       OB History    Para Term  AB Living   2 2 2 0 0 2   SAB IAB Ectopic Molar Multiple Live Births   0 0 0 0 0 2      Last Pap:   Social History     Substance and Sexual Activity   Sexual Activity Yes    Partners: Male     Sexual history: currently sexually active, single partner, contraceptive--IUD   H/O Abnormal Pap no  She  reports that she has never smoked. She has never used smokeless tobacco.        Allergies: Bloodless and Nkda [no known drug allergy]     ROS:    Reports no menopause symptoms of hot flashes, night sweats, sleep disruption, mood changes.Denies vaginal dryness.     Menses- none due to IUD, some spotting, occasional cramping.    No significant bloating/fluid retention, pelvic pain, or dyspareunia. No vaginal discharge   No breast tenderness, dimpling, mass, nipple discharge, skin changes, changes in size or contour, or abnormal cyclic discomfort.  No urinary tract symptoms, no incontinence, no polydipsia, polyuria,  No abdominal pain, change in bowel habits, black or bloody stools.    No unusual headaches, no visual changes, menstrual migraines   No prolonged cough. No dyspnea or chest pain on exertion.  No depression, labile mood, anxiety, libido changes, insomnia.  No temperature intolerance.  No new/concerning skin lesions, concerns.    Current medicines (including changes today)  Current Outpatient Medications   Medication Sig Dispense Refill    ketoconazole (NIZORAL) 2 % Cream Apply 1 Application topically 2 times a day. 60 g 1    albuterol 108 (90 Base) MCG/ACT Aero Soln inhalation aerosol Inhale 2 Puffs every 6 hours as needed for Shortness of Breath. 8.5 g 3    clotrimazole-betamethasone (LOTRISONE) 1-0.05 % Cream Apply 1 Application topically 2 times a day. Apply thin layer very sparingly. 2 weeks on, 1 week off. 15 g 1     No current  facility-administered medications for this visit.     She  has a past medical history of Allergic reaction (8/17/2015), ASTHMA, Migraine (8/16/2018), and Mild intermittent asthma without complication (8/16/2018).    She has no past medical history of Allergy, Headache(784.0), Migraine, or Ulcer.  She  has a past surgical history that includes pr lap,diagnostic abdomen (12/16/2020).     Family History:   Family History   Problem Relation Age of Onset    Hypertension Mother     Hypertension Father     Arthritis Maternal Grandmother     Autoimmune Disease Sister 18        lupus    Stroke Brother 31    No Known Problems Brother     Diabetes Maternal Uncle     Diabetes Paternal Aunt        OBJECTIVE:   /78   Pulse 69   Temp 36.2 °C (97.2 °F) (Temporal)   Ht 1.524 m (5')   Wt 68 kg (150 lb)   SpO2 98%   BMI 29.29 kg/m²   Body mass index is 29.29 kg/m².    General/Constitutional: Vitals as above, Well nourished, well developed female in no acute distress  HEAD AND NECK:  Ears normal.  Throat, oral cavity and tongue normal.  Neck supple. No adenopathy or masses in the neck or supraclavicular regions. No thyromegaly.   PSYCH: Judgment grossly appropriate, no apparent depression/anxiety  CHEST:  Clear, good air entry, no wheezes or rales.   HEART:  Regular rate and rhythm.  S1 and S2 normal. No edema  ABDOMEN:  Soft without tenderness, guarding, mass or organomegaly.  No CVA tenderness or inguinal adenopathy.   Genitourinary: Grossly normal external female genitalia with skin within normal limits,   EXTREMITIES:  Extremities and peripheral pulses are normal.   SKIN: color normal, vascularity normal, no edema, temperature normal   No rashes or suspicious skin lesions noted.     Breast Exam: Symmetrical, normal consistency without masses., No dimpling or skin changes, Normal nipples without discharge, no axillary lymphadenopathy  Pelvic Exam -  Normal external genitalia with no lesions. Vaginal Mucosa:  normal  vaginal mucosa, normal discharge . no adnexal masses or tenderness, no cervical motion tenderness, no uterine tenderness, speculum used with warm water as lubrication, Cervix well visualized with no discharge/friability/bleeding, cervical broom used to obtain cervical specimen, Cervix with no visible lesions. Thin Prep Pap is obtained, vaginal swab is not obtained and specimen(s) sent to lab    <ASSESSMENT and PLAN>  1. Pap smear for cervical cancer screening  Pap completed  THINPREP PAP W/HPV AND CTNG      2. Pain of finger of left hand  Unstable, check XR to evaluate for fracture  DX-FINGER(S) 2+ LEFT      3. Annual physical exam  CBC WITH DIFFERENTIAL    Comp Metabolic Panel    VITAMIN D,25 HYDROXY (DEFICIENCY)    TSH WITH REFLEX TO FT4    Lipid Profile      4. Vitamin D deficiency  VITAMIN D,25 HYDROXY (DEFICIENCY)      5. Need for hepatitis C screening test  HEP C VIRUS ANTIBODY      6. Immunity status testing  HEP B SURFACE AB      7. Need for vaccination  Tdap =>6yo IM    Pneumococcal Conjugate Vaccine 20-Valent (19 yrs+)          Discussed  breast self exam   Follow-up in 5 years for next Gyn exam and 5 years for next Pap.   Next office visit for recheck of chronic medical conditions is due in after labs done    The MA is performing the above orders under the direction of Dr. Dillon    Please note that this dictation was created using voice recognition software. I have made every reasonable attempt to correct obvious errors, but I expect that there are errors of grammar and possibly content that I did not discover before finalizing the note.

## 2023-08-29 LAB
C TRACH RRNA CVX QL NAA+PROBE: NEGATIVE
CYTOLOGIST CVX/VAG CYTO: NORMAL
CYTOLOGY CVX/VAG DOC CYTO: NORMAL
CYTOLOGY CVX/VAG DOC THIN PREP: NORMAL
HPV I/H RISK 4 DNA CVX QL PROBE+SIG AMP: NEGATIVE
HPV REFLEX NL1174300: NORMAL
N GONORRHOEA RRNA CVX QL NAA+PROBE: NEGATIVE
NOTE NL11727A: NORMAL
OTHER STN SPEC: NORMAL
STAT OF ADQ CVX/VAG CYTO-IMP: NORMAL

## 2023-09-14 ENCOUNTER — HOSPITAL ENCOUNTER (OUTPATIENT)
Dept: RADIOLOGY | Facility: MEDICAL CENTER | Age: 38
End: 2023-09-14
Attending: NURSE PRACTITIONER
Payer: COMMERCIAL

## 2023-09-14 ENCOUNTER — HOSPITAL ENCOUNTER (OUTPATIENT)
Dept: LAB | Facility: MEDICAL CENTER | Age: 38
End: 2023-09-14
Attending: NURSE PRACTITIONER
Payer: COMMERCIAL

## 2023-09-14 DIAGNOSIS — Z11.59 NEED FOR HEPATITIS C SCREENING TEST: ICD-10-CM

## 2023-09-14 DIAGNOSIS — Z01.84 IMMUNITY STATUS TESTING: ICD-10-CM

## 2023-09-14 DIAGNOSIS — Z00.00 ANNUAL PHYSICAL EXAM: ICD-10-CM

## 2023-09-14 DIAGNOSIS — M79.645 PAIN OF FINGER OF LEFT HAND: ICD-10-CM

## 2023-09-14 DIAGNOSIS — E55.9 VITAMIN D DEFICIENCY: ICD-10-CM

## 2023-09-14 LAB
25(OH)D3 SERPL-MCNC: 26 NG/ML (ref 30–100)
ALBUMIN SERPL BCP-MCNC: 4.5 G/DL (ref 3.2–4.9)
ALBUMIN/GLOB SERPL: 1.6 G/DL
ALP SERPL-CCNC: 50 U/L (ref 30–99)
ALT SERPL-CCNC: 18 U/L (ref 2–50)
ANION GAP SERPL CALC-SCNC: 10 MMOL/L (ref 7–16)
AST SERPL-CCNC: 20 U/L (ref 12–45)
BASOPHILS # BLD AUTO: 0.6 % (ref 0–1.8)
BASOPHILS # BLD: 0.03 K/UL (ref 0–0.12)
BILIRUB SERPL-MCNC: 0.7 MG/DL (ref 0.1–1.5)
BUN SERPL-MCNC: 13 MG/DL (ref 8–22)
CALCIUM ALBUM COR SERPL-MCNC: 9.2 MG/DL (ref 8.5–10.5)
CALCIUM SERPL-MCNC: 9.6 MG/DL (ref 8.5–10.5)
CHLORIDE SERPL-SCNC: 105 MMOL/L (ref 96–112)
CHOLEST SERPL-MCNC: 174 MG/DL (ref 100–199)
CO2 SERPL-SCNC: 23 MMOL/L (ref 20–33)
CREAT SERPL-MCNC: 0.81 MG/DL (ref 0.5–1.4)
EOSINOPHIL # BLD AUTO: 0.05 K/UL (ref 0–0.51)
EOSINOPHIL NFR BLD: 1 % (ref 0–6.9)
ERYTHROCYTE [DISTWIDTH] IN BLOOD BY AUTOMATED COUNT: 41.6 FL (ref 35.9–50)
FASTING STATUS PATIENT QL REPORTED: NORMAL
GFR SERPLBLD CREATININE-BSD FMLA CKD-EPI: 97 ML/MIN/1.73 M 2
GLOBULIN SER CALC-MCNC: 2.8 G/DL (ref 1.9–3.5)
GLUCOSE SERPL-MCNC: 88 MG/DL (ref 65–99)
HBV SURFACE AB SERPL IA-ACNC: 25.3 MIU/ML (ref 0–10)
HCT VFR BLD AUTO: 40 % (ref 37–47)
HCV AB SER QL: NORMAL
HDLC SERPL-MCNC: 40 MG/DL
HGB BLD-MCNC: 13.3 G/DL (ref 12–16)
IMM GRANULOCYTES # BLD AUTO: 0.01 K/UL (ref 0–0.11)
IMM GRANULOCYTES NFR BLD AUTO: 0.2 % (ref 0–0.9)
LDLC SERPL CALC-MCNC: 122 MG/DL
LYMPHOCYTES # BLD AUTO: 1.58 K/UL (ref 1–4.8)
LYMPHOCYTES NFR BLD: 31.7 % (ref 22–41)
MCH RBC QN AUTO: 30.8 PG (ref 27–33)
MCHC RBC AUTO-ENTMCNC: 33.3 G/DL (ref 32.2–35.5)
MCV RBC AUTO: 92.6 FL (ref 81.4–97.8)
MONOCYTES # BLD AUTO: 0.47 K/UL (ref 0–0.85)
MONOCYTES NFR BLD AUTO: 9.4 % (ref 0–13.4)
NEUTROPHILS # BLD AUTO: 2.85 K/UL (ref 1.82–7.42)
NEUTROPHILS NFR BLD: 57.1 % (ref 44–72)
NRBC # BLD AUTO: 0 K/UL
NRBC BLD-RTO: 0 /100 WBC (ref 0–0.2)
PLATELET # BLD AUTO: 268 K/UL (ref 164–446)
PMV BLD AUTO: 11.6 FL (ref 9–12.9)
POTASSIUM SERPL-SCNC: 4.2 MMOL/L (ref 3.6–5.5)
PROT SERPL-MCNC: 7.3 G/DL (ref 6–8.2)
RBC # BLD AUTO: 4.32 M/UL (ref 4.2–5.4)
SODIUM SERPL-SCNC: 138 MMOL/L (ref 135–145)
TRIGL SERPL-MCNC: 60 MG/DL (ref 0–149)
TSH SERPL DL<=0.005 MIU/L-ACNC: 1.8 UIU/ML (ref 0.38–5.33)
WBC # BLD AUTO: 5 K/UL (ref 4.8–10.8)

## 2023-09-14 PROCEDURE — 82306 VITAMIN D 25 HYDROXY: CPT

## 2023-09-14 PROCEDURE — 73140 X-RAY EXAM OF FINGER(S): CPT | Mod: LT

## 2023-09-14 PROCEDURE — 86803 HEPATITIS C AB TEST: CPT

## 2023-09-14 PROCEDURE — 80053 COMPREHEN METABOLIC PANEL: CPT

## 2023-09-14 PROCEDURE — 36415 COLL VENOUS BLD VENIPUNCTURE: CPT

## 2023-09-14 PROCEDURE — 84443 ASSAY THYROID STIM HORMONE: CPT

## 2023-09-14 PROCEDURE — 80061 LIPID PANEL: CPT

## 2023-09-14 PROCEDURE — 85025 COMPLETE CBC W/AUTO DIFF WBC: CPT

## 2023-09-14 PROCEDURE — 86706 HEP B SURFACE ANTIBODY: CPT

## 2023-10-06 ENCOUNTER — OFFICE VISIT (OUTPATIENT)
Dept: URGENT CARE | Facility: PHYSICIAN GROUP | Age: 38
End: 2023-10-06
Payer: COMMERCIAL

## 2023-10-06 VITALS
SYSTOLIC BLOOD PRESSURE: 110 MMHG | HEART RATE: 97 BPM | DIASTOLIC BLOOD PRESSURE: 80 MMHG | TEMPERATURE: 97.8 F | OXYGEN SATURATION: 98 % | HEIGHT: 60 IN | WEIGHT: 154 LBS | BODY MASS INDEX: 30.23 KG/M2 | RESPIRATION RATE: 16 BRPM

## 2023-10-06 DIAGNOSIS — J02.9 PHARYNGITIS, UNSPECIFIED ETIOLOGY: ICD-10-CM

## 2023-10-06 DIAGNOSIS — Z20.818 EXPOSURE TO GROUP A STREPTOCOCCUS: ICD-10-CM

## 2023-10-06 PROCEDURE — 99213 OFFICE O/P EST LOW 20 MIN: CPT | Performed by: NURSE PRACTITIONER

## 2023-10-06 PROCEDURE — 3079F DIAST BP 80-89 MM HG: CPT | Performed by: NURSE PRACTITIONER

## 2023-10-06 PROCEDURE — 3074F SYST BP LT 130 MM HG: CPT | Performed by: NURSE PRACTITIONER

## 2023-10-06 RX ORDER — AMOXICILLIN 875 MG/1
875 TABLET, COATED ORAL 2 TIMES DAILY
Qty: 20 TABLET | Refills: 0 | Status: SHIPPED | OUTPATIENT
Start: 2023-10-06 | End: 2023-10-16

## 2023-10-06 ASSESSMENT — ENCOUNTER SYMPTOMS
HEADACHES: 1
COUGH: 1
SORE THROAT: 1
MYALGIAS: 1

## 2023-10-06 ASSESSMENT — FIBROSIS 4 INDEX: FIB4 SCORE: 0.62

## 2023-10-06 NOTE — PROGRESS NOTES
Subjective:     Pauline Perrin is a 37 y.o. female who presents for Sore Throat (Daughter has strep)      HPI  Pt presents for evaluation of a new problem.  Saran is a very pleasant 37-year-old female presents to urgent care today with complaints of a sore throat, congestion, headache and body aches x3 days.  Her daughter is in the clinic today and tested positive for strep throat.  She has been using over-the-counter supportive treatment for her symptoms.    Review of Systems   Constitutional:  Positive for malaise/fatigue.   HENT:  Positive for congestion and sore throat.    Respiratory:  Positive for cough.    Musculoskeletal:  Positive for myalgias.   Neurological:  Positive for headaches.       PMH:   Past Medical History:   Diagnosis Date    Allergic reaction 8/17/2015    ASTHMA     prn inhaler    Migraine 8/16/2018    Mild intermittent asthma without complication 8/16/2018     ALLERGIES:   Allergies   Allergen Reactions    Bloodless      Bloodless per request    Nkda [No Known Drug Allergy]      SURGHX:   Past Surgical History:   Procedure Laterality Date    WA LAP,DIAGNOSTIC ABDOMEN  12/16/2020    Procedure: PELVISCOPY;  Surgeon: Clara Beaulieu D.O.;  Location: SURGERY SAME DAY [facility];  Service: Obstetrics     SOCHX:   Social History     Socioeconomic History    Marital status:    Tobacco Use    Smoking status: Never    Smokeless tobacco: Never   Vaping Use    Vaping Use: Never used   Substance and Sexual Activity    Alcohol use: Yes     Comment: 1/week    Drug use: No    Sexual activity: Yes     Partners: Male     FH:   Family History   Problem Relation Age of Onset    Hypertension Mother     Hypertension Father     Arthritis Maternal Grandmother     Autoimmune Disease Sister 18        lupus    Stroke Brother 31    No Known Problems Brother     Diabetes Maternal Uncle     Diabetes Paternal Aunt          Objective:   /80   Pulse 97   Temp 36.6 °C (97.8 °F)   Resp 16   Ht  1.524 m (5')   Wt 69.9 kg (154 lb)   SpO2 98%   BMI 30.08 kg/m²     Physical Exam  Vitals and nursing note reviewed.   Constitutional:       General: She is not in acute distress.     Appearance: Normal appearance. She is ill-appearing.   HENT:      Head: Normocephalic and atraumatic.      Right Ear: Tympanic membrane, ear canal and external ear normal.      Left Ear: Tympanic membrane, ear canal and external ear normal.      Nose: Congestion present. No rhinorrhea.      Mouth/Throat:      Mouth: Mucous membranes are moist.      Pharynx: Posterior oropharyngeal erythema present. No oropharyngeal exudate.   Eyes:      Extraocular Movements: Extraocular movements intact.      Pupils: Pupils are equal, round, and reactive to light.   Cardiovascular:      Rate and Rhythm: Normal rate and regular rhythm.      Pulses: Normal pulses.      Heart sounds: Normal heart sounds.   Pulmonary:      Effort: Pulmonary effort is normal. No respiratory distress.      Breath sounds: Normal breath sounds. No stridor. No wheezing, rhonchi or rales.   Chest:      Chest wall: No tenderness.   Abdominal:      General: Abdomen is flat. Bowel sounds are normal.      Palpations: Abdomen is soft.      Tenderness: There is no abdominal tenderness. There is no right CVA tenderness or left CVA tenderness.   Musculoskeletal:         General: Normal range of motion.      Cervical back: Normal range of motion and neck supple. No tenderness.   Lymphadenopathy:      Cervical: No cervical adenopathy.   Skin:     General: Skin is warm and dry.      Capillary Refill: Capillary refill takes less than 2 seconds.   Neurological:      General: No focal deficit present.      Mental Status: She is alert and oriented to person, place, and time. Mental status is at baseline.   Psychiatric:         Mood and Affect: Mood normal.         Behavior: Behavior normal.         Thought Content: Thought content normal.         Judgment: Judgment normal.          Assessment/Plan:   Assessment      1. Exposure to group A Streptococcus  amoxicillin (AMOXIL) 875 MG tablet      2. Pharyngitis, unspecified etiology  amoxicillin (AMOXIL) 875 MG tablet        She was empirically started on amoxicillin for treatment of suspected strep throat.  Isolation guidelines discussed with patient.  Continue with over-the-counter supportive treatment as needed.  Return for persistent or worsening symptoms.  She is in agreement with plan of care.

## 2024-02-01 ENCOUNTER — OFFICE VISIT (OUTPATIENT)
Dept: MEDICAL GROUP | Facility: MEDICAL CENTER | Age: 39
End: 2024-02-01
Payer: COMMERCIAL

## 2024-02-01 VITALS
SYSTOLIC BLOOD PRESSURE: 118 MMHG | BODY MASS INDEX: 29.45 KG/M2 | DIASTOLIC BLOOD PRESSURE: 82 MMHG | OXYGEN SATURATION: 96 % | HEART RATE: 65 BPM | TEMPERATURE: 97 F | WEIGHT: 150 LBS | HEIGHT: 60 IN

## 2024-02-01 DIAGNOSIS — N63.15 MASS OVERLAPPING MULTIPLE QUADRANTS OF RIGHT BREAST: ICD-10-CM

## 2024-02-01 PROBLEM — N63.10 LUMP OF RIGHT BREAST: Status: ACTIVE | Noted: 2024-02-01

## 2024-02-01 PROCEDURE — 3074F SYST BP LT 130 MM HG: CPT | Performed by: NURSE PRACTITIONER

## 2024-02-01 PROCEDURE — 99214 OFFICE O/P EST MOD 30 MIN: CPT | Performed by: NURSE PRACTITIONER

## 2024-02-01 PROCEDURE — 3079F DIAST BP 80-89 MM HG: CPT | Performed by: NURSE PRACTITIONER

## 2024-02-01 ASSESSMENT — FIBROSIS 4 INDEX: FIB4 SCORE: 0.63

## 2024-02-01 NOTE — ASSESSMENT & PLAN NOTE
Reports noticing a lump that comes and goes over the past few months. Feels like it has been more noticeable recently. Also reports that she developed a blister in a different area of her breast about a week ago, now it is scabbing.     No history of breast lumps in the past, no known family history of breast cancer.     Occasional pain/burning sensation in the area of the lump.

## 2024-02-01 NOTE — PROGRESS NOTES
Subjective:   Pauline Perrin is a 38 y.o. female here today for breast lump    Mass overlapping multiple quadrants of right breast  Reports noticing a lump that comes and goes over the past few months. Feels like it has been more noticeable recently. Also reports that she developed a blister in a different area of her breast about a week ago, now it is scabbing.     No history of breast lumps in the past, no known family history of breast cancer.     Occasional pain/burning sensation in the area of the lump.      Current medicines (including changes today)  Current Outpatient Medications   Medication Sig Dispense Refill    ketoconazole (NIZORAL) 2 % Cream Apply 1 Application topically 2 times a day. 60 g 1    albuterol 108 (90 Base) MCG/ACT Aero Soln inhalation aerosol Inhale 2 Puffs every 6 hours as needed for Shortness of Breath. 8.5 g 3    clotrimazole-betamethasone (LOTRISONE) 1-0.05 % Cream Apply 1 Application topically 2 times a day. Apply thin layer very sparingly. 2 weeks on, 1 week off. 15 g 1     No current facility-administered medications for this visit.     She  has a past medical history of Allergic reaction (8/17/2015), ASTHMA, Migraine (8/16/2018), and Mild intermittent asthma without complication (8/16/2018).    She has no past medical history of Allergy, Headache(784.0), Migraine, or Ulcer.    ROS   No chest pain, no shortness of breath, no abdominal pain  Positive ROS as per HPI.  All other systems reviewed and are negative.     Objective:     /82   Pulse 65   Temp 36.1 °C (97 °F) (Temporal)   Ht 1.524 m (5')   Wt 68 kg (150 lb)   SpO2 96%  Body mass index is 29.29 kg/m².     Physical Exam:  Constitutional: Alert, no distress.  Skin: Warm, dry, good turgor, no rashes in visible areas.  Eye: Equal, round and reactive, conjunctiva clear, lids normal.  ENMT: Lips without lesions, good dentition  Respiratory: Unlabored respiratory effort  Psych: Alert and oriented x3, normal affect  and mood.  Breast Exam: No dimpling or skin changes, Normal nipples without discharge, no axillary lymphadenopathy, positive nodule 2-3 cm, irregular, firm, rubbery, mobile, and tender located right 9 o'clock lateral border        Assessment and Plan:   The following treatment plan was discussed    1. Mass overlapping multiple quadrants of right breast  Unstable  Lump palpated at 9 o'clock position on lateral border of breast  Possibly some dense tissue however it is tender  Check diagnostic mammo and US, ordered stat  - [facility] DIAGNOSTIC MAMMO RIGHT W/CAD; Future  - US-BREAST LIMITED-RIGHT; Future      Followup: Return for pending results.    The MA is performing the above orders under the direction of Dr. Dillon    Please note that this dictation was created using voice recognition software. I have made every reasonable attempt to correct obvious errors, but I expect that there are errors of grammar and possibly content that I did not discover before finalizing the note.

## 2024-02-09 ENCOUNTER — HOSPITAL ENCOUNTER (OUTPATIENT)
Dept: RADIOLOGY | Facility: MEDICAL CENTER | Age: 39
End: 2024-02-09
Attending: NURSE PRACTITIONER
Payer: COMMERCIAL

## 2024-02-09 DIAGNOSIS — N63.15 MASS OVERLAPPING MULTIPLE QUADRANTS OF RIGHT BREAST: ICD-10-CM

## 2024-02-09 PROCEDURE — 76642 ULTRASOUND BREAST LIMITED: CPT | Mod: RT

## 2024-02-09 PROCEDURE — G0279 TOMOSYNTHESIS, MAMMO: HCPCS

## 2024-05-29 ENCOUNTER — OFFICE VISIT (OUTPATIENT)
Dept: MEDICAL GROUP | Facility: MEDICAL CENTER | Age: 39
End: 2024-05-29
Payer: COMMERCIAL

## 2024-05-29 VITALS
HEART RATE: 76 BPM | BODY MASS INDEX: 30.63 KG/M2 | OXYGEN SATURATION: 96 % | WEIGHT: 156 LBS | DIASTOLIC BLOOD PRESSURE: 78 MMHG | HEIGHT: 60 IN | TEMPERATURE: 97 F | SYSTOLIC BLOOD PRESSURE: 118 MMHG

## 2024-05-29 DIAGNOSIS — J06.9 VIRAL URI WITH COUGH: ICD-10-CM

## 2024-05-29 DIAGNOSIS — J02.9 SORE THROAT: ICD-10-CM

## 2024-05-29 DIAGNOSIS — J45.21 MILD INTERMITTENT ASTHMA WITH ACUTE EXACERBATION: ICD-10-CM

## 2024-05-29 LAB
FLUAV RNA SPEC QL NAA+PROBE: NEGATIVE
FLUBV RNA SPEC QL NAA+PROBE: NEGATIVE
RSV RNA SPEC QL NAA+PROBE: NEGATIVE
S PYO DNA SPEC NAA+PROBE: NOT DETECTED
SARS-COV-2 RNA RESP QL NAA+PROBE: NEGATIVE

## 2024-05-29 RX ORDER — ALBUTEROL SULFATE 90 UG/1
2 AEROSOL, METERED RESPIRATORY (INHALATION) EVERY 6 HOURS PRN
Qty: 8.5 G | Refills: 3 | Status: SHIPPED | OUTPATIENT
Start: 2024-05-29

## 2024-05-29 RX ORDER — FLUTICASONE PROPIONATE 50 MCG
1 SPRAY, SUSPENSION (ML) NASAL DAILY
Qty: 16 G | Refills: 0 | Status: SHIPPED | OUTPATIENT
Start: 2024-05-29

## 2024-05-29 ASSESSMENT — FIBROSIS 4 INDEX: FIB4 SCORE: 0.63

## 2024-05-30 RX ORDER — METHYLPREDNISOLONE 4 MG/1
TABLET ORAL
Qty: 21 TABLET | Refills: 0 | Status: SHIPPED | OUTPATIENT
Start: 2024-05-30

## 2024-05-30 NOTE — PROGRESS NOTES
Subjective:     History of Present Illness  The patient presents for evaluation of cough.    The patient has been experiencing a significant cough since Saturday, she initially had body aches but those have resolved. Despite adhering to a regimen of over-the-counter medications, there has been no improvement in her symptoms. Her sleep has been disrupted due to the severity of the cough. Accompanying symptoms include a sore throat, sinus congestion, and a headache, which she attributes to mucus drainage. She experienced severe ear pain on Saturday, which has since resolved. She denies any fevers or chills. She experienced body aches on Friday, which have since resolved. She has a history of asthma, which has resulted in difficulty breathing, particularly during sleep. She is requesting a refill of her inhaler, which she typically uses during sports activities or illness, but has not been able to find this. She has attempted to manage her symptoms with over-the-counter medications such as acetaminophen, liquid cold medication, and a homeopathic cold medication with honey. She has also tried warm water and salt gargles, saline, and a sinus rinse, but these have not alleviated her symptoms. Her cough intensifies at night, causing some chest discomfort. She has a history of ear infections during her youth. She denies any sick contacts. She has not used Flonase. She expectorated mucus with a small amount of blood last night.      Current medicines (including changes today)  Current Outpatient Medications   Medication Sig Dispense Refill    albuterol 108 (90 Base) MCG/ACT Aero Soln inhalation aerosol Inhale 2 Puffs every 6 hours as needed for Shortness of Breath. 8.5 g 3    fluticasone (FLONASE) 50 MCG/ACT nasal spray Administer 1 Spray into affected nostril(S) every day. 16 g 0    ketoconazole (NIZORAL) 2 % Cream Apply 1 Application topically 2 times a day. 60 g 1    clotrimazole-betamethasone (LOTRISONE) 1-0.05 % Cream  Apply 1 Application topically 2 times a day. Apply thin layer very sparingly. 2 weeks on, 1 week off. 15 g 1     No current facility-administered medications for this visit.     She  has a past medical history of Allergic reaction (8/17/2015), ASTHMA, Migraine (8/16/2018), and Mild intermittent asthma without complication (8/16/2018).    She has no past medical history of Allergy, Headache(784.0), or Ulcer.    ROS  No chest pain, no shortness of breath, no abdominal pain  Positive ROS as per HPI.  All other systems reviewed and are negative.     Objective:     /78   Pulse 76   Temp 36.1 °C (97 °F) (Temporal)   Ht 1.524 m (5')   Wt 70.8 kg (156 lb)   SpO2 96%  Body mass index is 30.47 kg/m².   Physical Exam    Constitutional: Alert, no distress.  Skin: Warm, dry, good turgor, no rashes in visible areas.  Eye: Equal, round and reactive, conjunctiva clear, lids normal.  ENMT: Lips without lesions, good dentition, oropharynx clear.  Neck: Trachea midline, no masses, no thyromegaly. No cervical or supraclavicular lymphadenopathy  Respiratory: Unlabored respiratory effort, lungs clear to auscultation, no wheezes, no ronchi. Diminished expiratory breath sounds bilaterally.   Cardiovascular: Normal S1, S2, no murmur, no edema.  Psych: Alert and oriented x3, normal affect and mood.      Results  Laboratory Studies  Strep test was negative. Influenza test was negative. Covid test was negative. RSV test was negative.        Assessment and Plan:   The following treatment plan was discussed    Assessment & Plan  1. Viral upper respiratory infection (URI).  The patient's condition remains unstable, with a negative strep test. There is a potential exacerbation of asthma, however, her examination today is largely benign, with no wheezing detected during the examination. The patient is advised to persist with over-the-counter medications. A daily regimen of Flonase will be initiated, and the patient will commence the use  of an albuterol inhaler as required. A prescription for a 5-day course of steroids will be provided should her symptoms deteriorate or fail to improve over the next week. Tests for Strep, influenza, COVID-19, and RSV have returned negative results.    Follow-up  The patient is advised to schedule a follow-up appointment if her symptoms worsen or fail to improve.      ORDERS:  1. Viral URI with cough  - fluticasone (FLONASE) 50 MCG/ACT nasal spray; Administer 1 Spray into affected nostril(S) every day.  Dispense: 16 g; Refill: 0  - methylPREDNISolone (MEDROL DOSEPAK) 4 MG Tablet Therapy Pack; As directed on the packaging label.  Dispense: 21 Tablet; Refill: 0    2. Mild intermittent asthma with acute exacerbation  - albuterol 108 (90 Base) MCG/ACT Aero Soln inhalation aerosol; Inhale 2 Puffs every 6 hours as needed for Shortness of Breath.  Dispense: 8.5 g; Refill: 3  - methylPREDNISolone (MEDROL DOSEPAK) 4 MG Tablet Therapy Pack; As directed on the packaging label.  Dispense: 21 Tablet; Refill: 0    3. Sore throat  - POCT Cepheid CoV-2, Flu A/B, RSV - PCR  - POCT Cepheid Group A Strep - PCR            The MA is performing the above orders under the direction of Dr. Dillon    Please note that this dictation was created using voice recognition software. I have made every reasonable attempt to correct obvious errors, but I expect that there are errors of grammar and possibly content that I did not discover before finalizing the note.      Attestation      Verbal consent was acquired by the patient to use [employer] ambient listening note generation during this visit Yes

## 2024-06-26 DIAGNOSIS — J06.9 VIRAL URI WITH COUGH: ICD-10-CM

## 2024-06-26 RX ORDER — FLUTICASONE PROPIONATE 50 MCG
1 SPRAY, SUSPENSION (ML) NASAL DAILY
Qty: 16 ML | Refills: 1 | Status: SHIPPED | OUTPATIENT
Start: 2024-06-26

## 2024-09-23 ENCOUNTER — OFFICE VISIT (OUTPATIENT)
Dept: MEDICAL GROUP | Facility: MEDICAL CENTER | Age: 39
End: 2024-09-23
Payer: COMMERCIAL

## 2024-09-23 VITALS
HEIGHT: 60 IN | SYSTOLIC BLOOD PRESSURE: 110 MMHG | DIASTOLIC BLOOD PRESSURE: 68 MMHG | BODY MASS INDEX: 31.41 KG/M2 | WEIGHT: 160 LBS | TEMPERATURE: 97 F | OXYGEN SATURATION: 98 % | HEART RATE: 87 BPM

## 2024-09-23 DIAGNOSIS — R19.05 PERIUMBILICAL MASS: ICD-10-CM

## 2024-09-23 DIAGNOSIS — R10.9 STOMACH PAIN: ICD-10-CM

## 2024-09-23 DIAGNOSIS — R19.06 EPIGASTRIC MASS: ICD-10-CM

## 2024-09-23 DIAGNOSIS — R19.7 DIARRHEA, UNSPECIFIED TYPE: ICD-10-CM

## 2024-09-23 PROCEDURE — 99214 OFFICE O/P EST MOD 30 MIN: CPT | Performed by: NURSE PRACTITIONER

## 2024-09-23 PROCEDURE — 3078F DIAST BP <80 MM HG: CPT | Performed by: NURSE PRACTITIONER

## 2024-09-23 PROCEDURE — 3074F SYST BP LT 130 MM HG: CPT | Performed by: NURSE PRACTITIONER

## 2024-09-23 RX ORDER — SUCRALFATE ORAL 1 G/10ML
1 SUSPENSION ORAL
Qty: 1200 ML | Refills: 0 | Status: SHIPPED | OUTPATIENT
Start: 2024-09-23 | End: 2024-10-23

## 2024-09-23 ASSESSMENT — FIBROSIS 4 INDEX: FIB4 SCORE: 0.63
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
## 2024-10-15 ENCOUNTER — HOSPITAL ENCOUNTER (OUTPATIENT)
Dept: RADIOLOGY | Facility: MEDICAL CENTER | Age: 39
End: 2024-10-15
Attending: NURSE PRACTITIONER
Payer: COMMERCIAL

## 2024-10-15 DIAGNOSIS — R19.05 PERIUMBILICAL MASS: ICD-10-CM

## 2024-10-15 DIAGNOSIS — R10.9 STOMACH PAIN: ICD-10-CM

## 2024-10-15 PROCEDURE — 76700 US EXAM ABDOM COMPLETE: CPT

## 2024-10-21 DIAGNOSIS — R10.9 STOMACH PAIN: ICD-10-CM

## 2024-11-13 ENCOUNTER — OFFICE VISIT (OUTPATIENT)
Dept: MEDICAL GROUP | Facility: MEDICAL CENTER | Age: 39
End: 2024-11-13
Payer: COMMERCIAL

## 2024-11-13 VITALS
HEART RATE: 100 BPM | BODY MASS INDEX: 30.23 KG/M2 | WEIGHT: 154 LBS | OXYGEN SATURATION: 98 % | HEIGHT: 60 IN | SYSTOLIC BLOOD PRESSURE: 120 MMHG | RESPIRATION RATE: 18 BRPM | DIASTOLIC BLOOD PRESSURE: 64 MMHG | TEMPERATURE: 97.9 F

## 2024-11-13 DIAGNOSIS — Z00.00 ANNUAL PHYSICAL EXAM: ICD-10-CM

## 2024-11-13 DIAGNOSIS — E55.9 VITAMIN D DEFICIENCY: ICD-10-CM

## 2024-11-13 DIAGNOSIS — R10.9 STOMACH PAIN: ICD-10-CM

## 2024-11-13 DIAGNOSIS — R19.05 PERIUMBILICAL MASS: ICD-10-CM

## 2024-11-13 DIAGNOSIS — Z13.79 ENCOUNTER FOR GENETIC SCREENING: ICD-10-CM

## 2024-11-13 DIAGNOSIS — Z83.3 FAMILY HISTORY OF DIABETES MELLITUS: ICD-10-CM

## 2024-11-13 PROCEDURE — 3078F DIAST BP <80 MM HG: CPT | Performed by: NURSE PRACTITIONER

## 2024-11-13 PROCEDURE — 3074F SYST BP LT 130 MM HG: CPT | Performed by: NURSE PRACTITIONER

## 2024-11-13 PROCEDURE — 99214 OFFICE O/P EST MOD 30 MIN: CPT | Performed by: NURSE PRACTITIONER

## 2024-11-13 ASSESSMENT — FIBROSIS 4 INDEX: FIB4 SCORE: 0.63

## 2024-11-14 ENCOUNTER — RESEARCH ENCOUNTER (OUTPATIENT)
Dept: RESEARCH | Facility: MEDICAL CENTER | Age: 39
End: 2024-11-14

## 2024-11-14 NOTE — PROGRESS NOTES
Subjective:     History of Present Illness  The patient presents for a follow-up on her ultrasound and stomach pain    She reports that the omeprazole and carafate prescribed during her last visit in 2023 alleviated her discomfort, particularly when eating. However, she experienced heartburn after discontinuing the medication. She had been taking the medication nightly for a month and did not refill the prescription. She continues to experience a lump sensation near her umbilicus, which is tender to touch.     She recalls an incident about 4 or 5 months ago when she felt a tearing sensation in her stomach while stretching her arms at her desk. This caused severe pain and prevented her from standing up. She had to lie down until the pain subsided.    She has a history of ovarian cyst surgery with surgical access site in her umbilicus    FAMILY HISTORY  Most of her family members have  from complications from diabetes.    IMMUNIZATIONS  She received her influenza vaccine in 2023.      Current medicines (including changes today)  Current Outpatient Medications   Medication Sig Dispense Refill    omeprazole (PRILOSEC) 20 MG delayed-release capsule Take 1 Capsule by mouth every day. 30 Capsule 0    fluticasone (FLONASE) 50 MCG/ACT nasal spray ADMINISTER 1 SPRAY INTO AFFECTED NOSTRIL(S) EVERY DAY. 16 mL 1    albuterol 108 (90 Base) MCG/ACT Aero Soln inhalation aerosol Inhale 2 Puffs every 6 hours as needed for Shortness of Breath. 8.5 g 3    ketoconazole (NIZORAL) 2 % Cream Apply 1 Application topically 2 times a day. 60 g 1    clotrimazole-betamethasone (LOTRISONE) 1-0.05 % Cream Apply 1 Application topically 2 times a day. Apply thin layer very sparingly. 2 weeks on, 1 week off. 15 g 1     No current facility-administered medications for this visit.     She  has a past medical history of Allergic reaction (2015), ASTHMA, Migraine (2018), and Mild intermittent asthma without complication  (8/16/2018).    She has no past medical history of Allergy, Headache(784.0), or Ulcer.    ROS   No chest pain, no shortness of breath, no abdominal pain  Positive ROS as per HPI.  All other systems reviewed and are negative.     Objective:     /64   Pulse 100   Temp 36.6 °C (97.9 °F)   Resp 18   Ht 1.524 m (5')   Wt 69.9 kg (154 lb)   SpO2 98%  Body mass index is 30.08 kg/m².   Physical Exam  Vital Signs  Weight is 154.  Constitutional: Alert, no distress.  Skin: Warm, dry, good turgor, no rashes in visible areas.  Eye: Equal, round and reactive, conjunctiva clear, lids normal.  ENMT: Lips without lesions, good dentition  Respiratory: Unlabored respiratory effort  Abdomen: small, round, firm, tender lump palpated above umbilicus along abdominal midline  Psych: Alert and oriented x3, normal affect and mood.      Results  Imaging  Ultrasound: Gallbladder, bladder, kidneys, liver, and pancreas all looked good.        Assessment and Plan:   The following treatment plan was discussed    Assessment & Plan  1. Heartburn.  She reports experiencing heartburn after discontinuing her medication. It is explained that this could be due to a rebound effect of acid production after stopping the medication. She is advised to restart Prilosec and take it for a week, then taper off by taking it every other day for a couple of weeks. A prescription refill for Prilosec will be sent to [facility] on [address] in [place].    2. Lump in abdomen.  She continues to feel a lump in her abdomen, which was previously evaluated with an abdominal ultrasound showing no abnormalities in the gallbladder, bladder, kidneys, liver, or pancreas. An additional ultrasound will be ordered to examine the lump more superficially and check for any potential hernias. If the ultrasound indicates a hernia with bowel involvement, a referral to a surgeon will be made.    3. Health Maintenance.  A referral for genetic screening, including tests for Pizarro syndrome,  BRCA genes, and early heart disease, will be made. Annual lab work will be ordered to monitor her A1c levels and ensure no changes in her health status. She is advised to get a flu shot as the previous one from February has likely worn off.    Follow-up  Return in 3 months for follow-up.      ORDERS:  1. Periumbilical mass  - US-HERNIA ABDOMEN; Future    2. Stomach pain  - omeprazole (PRILOSEC) 20 MG delayed-release capsule; Take 1 Capsule by mouth every day.  Dispense: 30 Capsule; Refill: 0  - US-HERNIA ABDOMEN; Future    3. Encounter for genetic screening  - Referral to Genetic Research Studies    4. Annual physical exam  - CBC WITH DIFFERENTIAL; Future  - Comp Metabolic Panel; Future  - VITAMIN D,25 HYDROXY (DEFICIENCY); Future  - TSH WITH REFLEX TO FT4; Future  - Lipid Profile; Future  - HEMOGLOBIN A1C; Future    5. Vitamin D deficiency  - VITAMIN D,25 HYDROXY (DEFICIENCY); Future    6. Family history of diabetes mellitus  - HEMOGLOBIN A1C; Future          The MA is performing the above orders under the direction of Dr. Dillon    Please note that this dictation was created using voice recognition software. I have made every reasonable attempt to correct obvious errors, but I expect that there are errors of grammar and possibly content that I did not discover before finalizing the note.      Attestation      Verbal consent was acquired by the patient to use [employer] ambient listening note generation during this visit Yes

## 2024-11-14 NOTE — RESEARCH NOTE
Patient has been referred by GLORY Sierra. The initial referral follow-up message, which includes the consent form link, has been sent to the patient.    Eligible Studies: HNP

## 2024-12-18 ENCOUNTER — HOSPITAL ENCOUNTER (OUTPATIENT)
Dept: RADIOLOGY | Facility: MEDICAL CENTER | Age: 39
End: 2024-12-18
Attending: NURSE PRACTITIONER
Payer: COMMERCIAL

## 2024-12-18 DIAGNOSIS — R19.05 PERIUMBILICAL MASS: ICD-10-CM

## 2024-12-18 DIAGNOSIS — R10.9 STOMACH PAIN: ICD-10-CM

## 2024-12-18 PROCEDURE — 76705 ECHO EXAM OF ABDOMEN: CPT

## 2025-01-09 ENCOUNTER — HOSPITAL ENCOUNTER (OUTPATIENT)
Dept: LAB | Facility: MEDICAL CENTER | Age: 40
End: 2025-01-09
Attending: NURSE PRACTITIONER
Payer: COMMERCIAL

## 2025-01-09 DIAGNOSIS — Z00.00 ANNUAL PHYSICAL EXAM: ICD-10-CM

## 2025-01-09 DIAGNOSIS — Z83.3 FAMILY HISTORY OF DIABETES MELLITUS: ICD-10-CM

## 2025-01-09 DIAGNOSIS — E55.9 VITAMIN D DEFICIENCY: ICD-10-CM

## 2025-01-09 LAB
BASOPHILS # BLD AUTO: 0.6 % (ref 0–1.8)
BASOPHILS # BLD: 0.04 K/UL (ref 0–0.12)
EOSINOPHIL # BLD AUTO: 0.1 K/UL (ref 0–0.51)
EOSINOPHIL NFR BLD: 1.6 % (ref 0–6.9)
ERYTHROCYTE [DISTWIDTH] IN BLOOD BY AUTOMATED COUNT: 39.6 FL (ref 35.9–50)
EST. AVERAGE GLUCOSE BLD GHB EST-MCNC: 108 MG/DL
HBA1C MFR BLD: 5.4 % (ref 4–5.6)
HCT VFR BLD AUTO: 40.3 % (ref 37–47)
HGB BLD-MCNC: 13.5 G/DL (ref 12–16)
IMM GRANULOCYTES # BLD AUTO: 0.02 K/UL (ref 0–0.11)
IMM GRANULOCYTES NFR BLD AUTO: 0.3 % (ref 0–0.9)
LYMPHOCYTES # BLD AUTO: 1.46 K/UL (ref 1–4.8)
LYMPHOCYTES NFR BLD: 23.6 % (ref 22–41)
MCH RBC QN AUTO: 30.3 PG (ref 27–33)
MCHC RBC AUTO-ENTMCNC: 33.5 G/DL (ref 32.2–35.5)
MCV RBC AUTO: 90.6 FL (ref 81.4–97.8)
MONOCYTES # BLD AUTO: 0.54 K/UL (ref 0–0.85)
MONOCYTES NFR BLD AUTO: 8.7 % (ref 0–13.4)
NEUTROPHILS # BLD AUTO: 4.02 K/UL (ref 1.82–7.42)
NEUTROPHILS NFR BLD: 65.2 % (ref 44–72)
NRBC # BLD AUTO: 0 K/UL
NRBC BLD-RTO: 0 /100 WBC (ref 0–0.2)
PLATELET # BLD AUTO: 280 K/UL (ref 164–446)
PMV BLD AUTO: 11.6 FL (ref 9–12.9)
RBC # BLD AUTO: 4.45 M/UL (ref 4.2–5.4)
WBC # BLD AUTO: 6.2 K/UL (ref 4.8–10.8)

## 2025-01-09 PROCEDURE — 82306 VITAMIN D 25 HYDROXY: CPT

## 2025-01-09 PROCEDURE — 80061 LIPID PANEL: CPT

## 2025-01-09 PROCEDURE — 85025 COMPLETE CBC W/AUTO DIFF WBC: CPT

## 2025-01-09 PROCEDURE — 80053 COMPREHEN METABOLIC PANEL: CPT

## 2025-01-09 PROCEDURE — 36415 COLL VENOUS BLD VENIPUNCTURE: CPT

## 2025-01-09 PROCEDURE — 84443 ASSAY THYROID STIM HORMONE: CPT

## 2025-01-09 PROCEDURE — 83036 HEMOGLOBIN GLYCOSYLATED A1C: CPT

## 2025-01-10 LAB
25(OH)D3 SERPL-MCNC: 18 NG/ML (ref 30–100)
ALBUMIN SERPL BCP-MCNC: 4.4 G/DL (ref 3.2–4.9)
ALBUMIN/GLOB SERPL: 1.7 G/DL
ALP SERPL-CCNC: 52 U/L (ref 30–99)
ALT SERPL-CCNC: 16 U/L (ref 2–50)
ANION GAP SERPL CALC-SCNC: 8 MMOL/L (ref 7–16)
AST SERPL-CCNC: 16 U/L (ref 12–45)
BILIRUB SERPL-MCNC: 0.4 MG/DL (ref 0.1–1.5)
BUN SERPL-MCNC: 10 MG/DL (ref 8–22)
CALCIUM ALBUM COR SERPL-MCNC: 8.5 MG/DL (ref 8.5–10.5)
CALCIUM SERPL-MCNC: 8.8 MG/DL (ref 8.5–10.5)
CHLORIDE SERPL-SCNC: 103 MMOL/L (ref 96–112)
CHOLEST SERPL-MCNC: 172 MG/DL (ref 100–199)
CO2 SERPL-SCNC: 26 MMOL/L (ref 20–33)
CREAT SERPL-MCNC: 0.7 MG/DL (ref 0.5–1.4)
FASTING STATUS PATIENT QL REPORTED: NORMAL
GFR SERPLBLD CREATININE-BSD FMLA CKD-EPI: 114 ML/MIN/1.73 M 2
GLOBULIN SER CALC-MCNC: 2.6 G/DL (ref 1.9–3.5)
GLUCOSE SERPL-MCNC: 101 MG/DL (ref 65–99)
HDLC SERPL-MCNC: 41 MG/DL
LDLC SERPL CALC-MCNC: 111 MG/DL
POTASSIUM SERPL-SCNC: 3.9 MMOL/L (ref 3.6–5.5)
PROT SERPL-MCNC: 7 G/DL (ref 6–8.2)
SODIUM SERPL-SCNC: 137 MMOL/L (ref 135–145)
TRIGL SERPL-MCNC: 102 MG/DL (ref 0–149)
TSH SERPL DL<=0.005 MIU/L-ACNC: 2.12 UIU/ML (ref 0.38–5.33)

## 2025-02-13 ENCOUNTER — OFFICE VISIT (OUTPATIENT)
Dept: URGENT CARE | Facility: PHYSICIAN GROUP | Age: 40
End: 2025-02-13
Payer: COMMERCIAL

## 2025-02-13 VITALS
WEIGHT: 155 LBS | TEMPERATURE: 97.5 F | BODY MASS INDEX: 30.43 KG/M2 | HEART RATE: 74 BPM | SYSTOLIC BLOOD PRESSURE: 124 MMHG | DIASTOLIC BLOOD PRESSURE: 80 MMHG | HEIGHT: 60 IN | RESPIRATION RATE: 16 BRPM | OXYGEN SATURATION: 100 %

## 2025-02-13 DIAGNOSIS — L21.9 SEBORRHEIC DERMATITIS: ICD-10-CM

## 2025-02-13 DIAGNOSIS — L71.0 PERIORAL DERMATITIS: ICD-10-CM

## 2025-02-13 DIAGNOSIS — L50.8 URTICARIA, ACUTE: ICD-10-CM

## 2025-02-13 PROCEDURE — 99213 OFFICE O/P EST LOW 20 MIN: CPT

## 2025-02-13 PROCEDURE — 3074F SYST BP LT 130 MM HG: CPT

## 2025-02-13 PROCEDURE — 3079F DIAST BP 80-89 MM HG: CPT

## 2025-02-13 RX ORDER — PREDNISONE 20 MG/1
TABLET ORAL
Qty: 15 TABLET | Refills: 0 | Status: SHIPPED | OUTPATIENT
Start: 2025-02-13 | End: 2025-02-23

## 2025-02-13 RX ORDER — TRIAMCINOLONE ACETONIDE 0.25 MG/G
1 CREAM TOPICAL 2 TIMES DAILY
Qty: 15 G | Refills: 0 | Status: SHIPPED | OUTPATIENT
Start: 2025-02-13 | End: 2025-02-20

## 2025-02-13 RX ORDER — CLOTRIMAZOLE AND BETAMETHASONE DIPROPIONATE 10; .64 MG/G; MG/G
1 CREAM TOPICAL 2 TIMES DAILY
Qty: 15 G | Refills: 1 | Status: SHIPPED | OUTPATIENT
Start: 2025-02-13

## 2025-02-13 RX ORDER — HYDROXYZINE HYDROCHLORIDE 25 MG/1
25 TABLET, FILM COATED ORAL
Qty: 30 TABLET | Refills: 1 | Status: SHIPPED | OUTPATIENT
Start: 2025-02-13 | End: 2025-03-15

## 2025-02-13 ASSESSMENT — ENCOUNTER SYMPTOMS
SORE THROAT: 0
COUGH: 0
DIARRHEA: 0
VOMITING: 0
FEVER: 0

## 2025-02-13 ASSESSMENT — FIBROSIS 4 INDEX: FIB4 SCORE: 0.53

## 2025-02-13 NOTE — PROGRESS NOTES
Subjective     Pauline Perrin is a 39 y.o. female who presents with Allergic Reaction (Bumps and rash on chest and chin- happens every few years. )            Rash  This is a new problem. The current episode started more than 1 month ago. The affected locations include the face, chest and abdomen. The rash is characterized by itchiness, burning, redness and swelling. She was exposed to nothing. Pertinent negatives include no congestion, cough, diarrhea, fever, shortness of breath, sore throat or vomiting. Treatments tried: aquaphor and previously prescribed antifungal cream. The treatment provided no relief. Her past medical history is significant for asthma. There is no history of eczema.       Review of Systems   Constitutional:  Negative for chills and fever.   HENT:  Negative for congestion and sore throat.    Respiratory:  Negative for cough, shortness of breath and wheezing.    Cardiovascular:  Negative for chest pain and palpitations.   Gastrointestinal:  Negative for abdominal pain, diarrhea, nausea and vomiting.   Skin:  Positive for itching and rash.   Neurological:  Negative for dizziness, weakness and headaches.              Objective     /80   Pulse 74   Temp 36.4 °C (97.5 °F) (Temporal)   Resp 16   Ht 1.524 m (5')   Wt 70.3 kg (155 lb)   SpO2 100%   BMI 30.27 kg/m²      Physical Exam  Constitutional:       General: She is not in acute distress.     Appearance: Normal appearance. She is not ill-appearing.   HENT:      Head: Normocephalic and atraumatic.      Right Ear: Tympanic membrane is not erythematous.      Left Ear: Tympanic membrane is not erythematous.      Nose: No congestion.      Mouth/Throat:      Mouth: Mucous membranes are moist.   Eyes:      Extraocular Movements: Extraocular movements intact.      Conjunctiva/sclera: Conjunctivae normal.      Pupils: Pupils are equal, round, and reactive to light.   Cardiovascular:      Rate and Rhythm: Normal rate and regular  rhythm.   Pulmonary:      Effort: Pulmonary effort is normal. No respiratory distress.      Breath sounds: No stridor. No wheezing.   Musculoskeletal:         General: Normal range of motion.   Skin:     General: Skin is dry.             Comments: Urticarial rash to anterior upper chest, with palpable purpura to lower chest, left lateral breast, and chin.  Reports itchiness, burning, and mild pain.  No erythema under nose, but flaky/scaly macules that are not itchy or burning.   Neurological:      General: No focal deficit present.      Mental Status: She is alert and oriented to person, place, and time. Mental status is at baseline.      Motor: No weakness.              Assessment & Plan  Perioral dermatitis    Orders:    clotrimazole-betamethasone (LOTRISONE) 1-0.05 % Cream; Apply 1 Application  topically 2 times a day. Apply thin layer very sparingly. 2 weeks on, 1 week off.    triamcinolone acetonide (KENALOG) 0.025 % Cream; Apply 1 Application topically 2 times a day for 7 days.    Urticaria, acute    Orders:    predniSONE (DELTASONE) 20 MG Tab; Take 2 Tablets by mouth every day for 5 days, THEN 1 Tablet every day for 5 days.    Seborrheic dermatitis    Orders:    Referral to Dermatology       This is an acute on chronic condition.  Previous visits, past medical history, medications, vital signs reviewed.  Patient reports she has had recurrent rash for 15 years.  She is managed by her PCP for seborrheic and perioral dermatitis with antifungal cream.  However patient reports current episode of urticaria to her chest started yesterday and have gotten progressively red, burning, and itchy.  She reports this recurs annually.  Denies new medication, food, or animal exposure.  Given generalized urticaria will treat with 10-day steroid taper.  Given topical antifungal has worked well for her in the past for seborrheic dermatitis, will refill per patient request.  Patient reports perioral dermatitis has not resolved  with current home regimen, will treat with topical corticosteroid BID.  Derm referral for recurring episodes for over 10 years.  Patient has PCP appointment in 2 weeks, for which she can discuss further treatment.  Return to clinic within 5 to 7 days if symptoms do not improve or worsen on oral and topical steroid.  ER for severe symptoms.  Advised to rest and not doing so.    Patient understands and is agreeable to treatment plan.  Denies further questions.       Please note that this dictation was created using voice recognition software. I have made every reasonable attempt to correct obvious errors, but I expect that there are errors of grammar and possibly content that I did not discover before finalizing the note.

## 2025-02-14 ASSESSMENT — ENCOUNTER SYMPTOMS
ABDOMINAL PAIN: 0
PALPITATIONS: 0
WHEEZING: 0
CHILLS: 0
SHORTNESS OF BREATH: 0
WEAKNESS: 0
HEADACHES: 0
DIZZINESS: 0
NAUSEA: 0

## 2025-02-14 NOTE — ASSESSMENT & PLAN NOTE
Orders:    clotrimazole-betamethasone (LOTRISONE) 1-0.05 % Cream; Apply 1 Application  topically 2 times a day. Apply thin layer very sparingly. 2 weeks on, 1 week off.    triamcinolone acetonide (KENALOG) 0.025 % Cream; Apply 1 Application topically 2 times a day for 7 days.

## 2025-02-17 NOTE — Clinical Note
REFERRAL APPROVAL NOTICE         Sent on February 17, 2025                   Pauline Perrin  [address] [facility] [address]                   Dear Ms. Perrin,    After a careful review of the medical information and benefit coverage, [facility] has processed your referral. See below for additional details.    If applicable, you must be actively enrolled with your insurance for coverage of the authorized service. If you have any questions regarding your coverage, please contact your insurance directly.    REFERRAL INFORMATION   Referral #:  [id number]  Referred-To Service Location    Referred-By Provider:  Dermatology    KENIA Thomson   [facility]      [address]  [address]  [place] [address]  [phone] [address]  [place] [address]  [phone]    Referral Start Date:  02/13/2025  Referral End Date:   02/13/2026             SCHEDULING  If you do not already have an appointment, please call [phone] to make an appointment.     MORE INFORMATION  If you do not already have a [facility] account, sign up at: [email or web address]  You can access your medical information, make appointments, see lab results, billing information, and more.  If you have questions regarding this referral, please contact  the [facility] Referrals department at:             [phone]. Monday - Friday 8:00AM - 5:00PM.     Sincerely,    [facility]

## 2025-02-27 ENCOUNTER — OFFICE VISIT (OUTPATIENT)
Dept: MEDICAL GROUP | Facility: IMAGING CENTER | Age: 40
End: 2025-02-27
Payer: COMMERCIAL

## 2025-02-27 VITALS
TEMPERATURE: 98.1 F | HEART RATE: 68 BPM | BODY MASS INDEX: 30.37 KG/M2 | HEIGHT: 60 IN | WEIGHT: 154.7 LBS | SYSTOLIC BLOOD PRESSURE: 108 MMHG | DIASTOLIC BLOOD PRESSURE: 70 MMHG | OXYGEN SATURATION: 99 %

## 2025-02-27 DIAGNOSIS — D17.1 LIPOMA OF TORSO: ICD-10-CM

## 2025-02-27 DIAGNOSIS — Z00.00 ANNUAL PHYSICAL EXAM: ICD-10-CM

## 2025-02-27 DIAGNOSIS — Z12.31 SCREENING MAMMOGRAM, ENCOUNTER FOR: ICD-10-CM

## 2025-02-27 DIAGNOSIS — L21.9 SEBORRHEIC DERMATITIS: ICD-10-CM

## 2025-02-27 DIAGNOSIS — J45.20 MILD INTERMITTENT ASTHMA WITHOUT COMPLICATION: ICD-10-CM

## 2025-02-27 DIAGNOSIS — E55.9 VITAMIN D DEFICIENCY: ICD-10-CM

## 2025-02-27 DIAGNOSIS — E66.9 OBESITY (BMI 30-39.9): ICD-10-CM

## 2025-02-27 PROBLEM — H92.02 LEFT EAR PAIN: Status: RESOLVED | Noted: 2021-05-13 | Resolved: 2025-02-27

## 2025-02-27 PROBLEM — L02.214 ABSCESS OF LEFT GROIN: Status: RESOLVED | Noted: 2020-01-02 | Resolved: 2025-02-27

## 2025-02-27 PROBLEM — B35.1 ONYCHOMYCOSIS OF TOENAIL: Status: RESOLVED | Noted: 2018-08-16 | Resolved: 2025-02-27

## 2025-02-27 PROBLEM — R19.05 PERIUMBILICAL MASS: Status: RESOLVED | Noted: 2024-11-13 | Resolved: 2025-02-27

## 2025-02-27 PROBLEM — R10.30 LOWER ABDOMINAL PAIN: Status: RESOLVED | Noted: 2019-10-23 | Resolved: 2025-02-27

## 2025-02-27 PROBLEM — R19.06 EPIGASTRIC MASS: Status: RESOLVED | Noted: 2024-09-23 | Resolved: 2025-02-27

## 2025-02-27 PROBLEM — N63.15 MASS OVERLAPPING MULTIPLE QUADRANTS OF RIGHT BREAST: Status: RESOLVED | Noted: 2024-02-01 | Resolved: 2025-02-27

## 2025-02-27 PROBLEM — R63.5 WEIGHT GAIN: Status: RESOLVED | Noted: 2022-10-26 | Resolved: 2025-02-27

## 2025-02-27 PROBLEM — R19.7 DIARRHEA: Status: RESOLVED | Noted: 2024-09-23 | Resolved: 2025-02-27

## 2025-02-27 PROBLEM — R10.9 STOMACH PAIN: Status: RESOLVED | Noted: 2024-09-23 | Resolved: 2025-02-27

## 2025-02-27 PROBLEM — L71.0 PERIORAL DERMATITIS: Status: RESOLVED | Noted: 2020-01-02 | Resolved: 2025-02-27

## 2025-02-27 PROBLEM — M79.645 PAIN OF FINGER OF LEFT HAND: Status: RESOLVED | Noted: 2023-08-24 | Resolved: 2025-02-27

## 2025-02-27 PROBLEM — R30.0 DYSURIA: Status: RESOLVED | Noted: 2019-11-21 | Resolved: 2025-02-27

## 2025-02-27 RX ORDER — ERGOCALCIFEROL 1.25 MG/1
50000 CAPSULE ORAL
Qty: 12 CAPSULE | Refills: 0 | Status: SHIPPED | OUTPATIENT
Start: 2025-02-27

## 2025-02-27 RX ORDER — KETOCONAZOLE 20 MG/G
1 CREAM TOPICAL 2 TIMES DAILY
Qty: 60 G | Refills: 1 | Status: SHIPPED | OUTPATIENT
Start: 2025-02-27

## 2025-02-27 RX ORDER — DOXYCYCLINE HYCLATE 100 MG
100 TABLET ORAL 2 TIMES DAILY
Qty: 60 TABLET | Refills: 0 | Status: SHIPPED | OUTPATIENT
Start: 2025-02-27 | End: 2025-03-29

## 2025-02-27 ASSESSMENT — PATIENT HEALTH QUESTIONNAIRE - PHQ9: CLINICAL INTERPRETATION OF PHQ2 SCORE: 0

## 2025-02-27 ASSESSMENT — FIBROSIS 4 INDEX: FIB4 SCORE: 0.53

## 2025-02-27 NOTE — PROGRESS NOTES
Subjective:     History of Present Illness  The patient presents for an annual exam.    She has been experiencing recurrent facial flare-ups, which she has been managing with a topical medication prescribed during her last visit. A recent flare-up occurred on her chest, causing significant discomfort and itching, prompting an emergency room visit. She was prescribed hydralazine, which she discontinued after one dose due to adverse effects. The rash resolved within a few days. She also reports a persistent rash under her nose, characterized by redness and small bumps. She has previously consulted a dermatologist for these symptoms, but no definitive diagnosis was made. She is considering allergy testing as a potential diagnostic avenue. She was prescribed hydralazine, which she discontinued after one dose due to adverse effects.    She has been diagnosed with asthma, which is currently stable. She uses albuterol approximately three times per month, primarily during episodes of shortness of breath or anxiety. She does not require albuterol for exercise-induced symptoms. She uses albuterol approximately three times per month.    She has a history of low vitamin D levels and is currently on a supplement regimen. She is currently on a supplement regimen.    She has been attempting to lose weight and has incorporated eggs into her diet.    She has expressed interest in undergoing mammography due to a perceived increase in breast tissue density.    She has been experiencing recurrent facial flare-ups, which she has been managing with a topical medication prescribed during her last visit. A recent flare-up occurred on her chest, causing significant discomfort and itching, prompting an emergency room visit. She was prescribed hydralazine, which she discontinued after one dose due to adverse effects. The rash resolved within a few days. She also reports a persistent rash under her nose, characterized by redness and small  bumps. She has previously consulted a dermatologist for these symptoms, but no definitive diagnosis was made. She is considering allergy testing as a potential diagnostic avenue.    Supplemental Information  She has not had a migraine in a while. She still has her IUD. She has not received her influenza vaccine yet.    FAMILY HISTORY  She does not have a family history of breast cancer.    MEDICATIONS  Current: albuterol, hydralazine, doxycycline    IMMUNIZATIONS  She received the pneumonia vaccine.      Current medicines (including changes today)  Current Outpatient Medications   Medication Sig Dispense Refill    ergocalciferol (DRISDOL) 72793 UNIT capsule Take 1 Capsule by mouth every 7 days. 12 Capsule 0    ketoconazole (NIZORAL) 2 % Cream Apply 1 Application  topically 2 times a day. 60 g 1    doxycycline (VIBRAMYCIN) 100 MG Tab Take 1 Tablet by mouth 2 times a day for 30 days. 60 Tablet 0    clotrimazole-betamethasone (LOTRISONE) 1-0.05 % Cream Apply 1 Application  topically 2 times a day. Apply thin layer very sparingly. 2 weeks on, 1 week off. 15 g 1    hydrOXYzine HCl (ATARAX) 25 MG Tab Take 1 Tablet by mouth at bedtime as needed for Anxiety (allergies) for up to 30 days. 30 Tablet 1    omeprazole (PRILOSEC) 20 MG delayed-release capsule Take 1 Capsule by mouth every day. 30 Capsule 0    albuterol 108 (90 Base) MCG/ACT Aero Soln inhalation aerosol Inhale 2 Puffs every 6 hours as needed for Shortness of Breath. 8.5 g 3     No current facility-administered medications for this visit.     She  has a past medical history of Allergic reaction (8/17/2015), ASTHMA, Migraine (8/16/2018), and Mild intermittent asthma without complication (8/16/2018).    She has no past medical history of Allergy, Headache(784.0), or Ulcer.    ROS   No chest pain, no shortness of breath, no abdominal pain  Positive ROS as per HPI.  All other systems reviewed and are negative.     Objective:     /70 (BP Location: Left arm,  Patient Position: Sitting, BP Cuff Size: Adult)   Pulse 68   Temp 36.7 °C (98.1 °F) (Temporal)   Ht 1.524 m (5')   Wt 70.2 kg (154 lb 11.2 oz)   SpO2 99%  Body mass index is 30.21 kg/m².     Physical Exam  Lungs were auscultated.  Constitutional: Alert, no distress.  Skin: Warm, dry, good turgor, no rashes in visible areas.  Eye: Equal, round and reactive, conjunctiva clear, lids normal.  ENMT: Lips without lesions, good dentition, oropharynx clear.  Neck: Trachea midline, no masses, no thyromegaly. No cervical or supraclavicular lymphadenopathy  Respiratory: Unlabored respiratory effort, lungs clear to auscultation, no wheezes, no ronchi.  Cardiovascular: Normal S1, S2, no murmur, no edema.  Psych: Alert and oriented x3, normal affect and mood.      Results  Laboratory Studies  Red blood cells, white blood cells were normal range. Electrolytes, kidney function, liver function were normal. Fasting blood sugar was slightly elevated. Average blood sugars are normal. LDL cholesterol was slightly elevated. Thyroid function was normal. Vitamin D level is low.    Imaging  Ultrasound shows no hernia but a small soft tissue lump, likely a lipoma.        Assessment and Plan:   The following treatment plan was discussed    Assessment & Plan  1. Lipoma.  The ultrasound results indicate the presence of a benign lipoma, which does not necessitate any intervention unless it becomes symptomatic or causes discomfort. She has been advised to monitor the lipoma for any changes in size or associated pain. If the lipoma becomes bothersome, a referral for surgical removal will be considered.    2. Elevated LDL cholesterol.  Her LDL cholesterol levels are slightly elevated, although they have improved since the last assessment. She has been counseled to limit her intake of fatty foods, red meat, cheese, fast food, and fried foods.    3. Vitamin D deficiency.  Her vitamin D levels are consistently low. She has been informed about the  potential health risks associated with chronic vitamin D deficiency, including bone thinning, fractures, compromised immune system, mood disorders, sleep disturbances, and potential links to multiple sclerosis and Parkinson's disease. A prescription for high-dose vitamin D (50,000 IU) to be taken once weekly for 12 weeks has been provided. Subsequently, she will transition to an over-the-counter vitamin D3 with K2 supplement.    4. Seborrheic dermatitis.  Her seborrheic dermatitis appears to be triggered by cold, dry air, leading to skin inflammation. She has been advised to maintain optimal skin hydration during the winter months. Prescriptions for ketoconazole topical and oral doxycycline BID for 2-4 weeks will be started which worked well in the past. She has been instructed to apply the antifungal cream twice daily and take the antibiotic for a duration of 30 days, or until the symptoms resolve. Stop topical steroid creams.    5. Asthma.  Her asthma is currently stable. She has been advised to use her albuterol inhaler as needed, but not excessively. If she requires frequent use of the inhaler, she should seek immediate medical attention at an urgent care facility or emergency room for further evaluation and potential treatment with steroids or antibiotics.    6. Health maintenance.  Her last mammogram was conducted in February 2024. She has been advised to continue with self-breast examinations. A mammogram has been ordered.      ORDERS:  1. Annual physical exam    2. Seborrheic dermatitis  - ketoconazole (NIZORAL) 2 % Cream; Apply 1 Application  topically 2 times a day.  Dispense: 60 g; Refill: 1  - doxycycline (VIBRAMYCIN) 100 MG Tab; Take 1 Tablet by mouth 2 times a day for 30 days.  Dispense: 60 Tablet; Refill: 0    3. Lipoma of torso    4. Vitamin D deficiency  - ergocalciferol (DRISDOL) 39659 UNIT capsule; Take 1 Capsule by mouth every 7 days.  Dispense: 12 Capsule; Refill: 0    5. Mild intermittent  asthma without complication    6. Obesity (BMI 30-39.9)  - Patient identified as having weight management issue.  Appropriate orders and counseling given.    7. Screening mammogram, encounter for  - MA-SCREENING MAMMO BILAT W/CAD; Future          The MA is performing the above orders under the direction of Dr. Dillon    Please note that this dictation was created using voice recognition software. I have made every reasonable attempt to correct obvious errors, but I expect that there are errors of grammar and possibly content that I did not discover before finalizing the note.      Attestation      Verbal consent was acquired by the patient to use [employer] ambient listening note generation during this visit Yes

## 2025-05-15 ENCOUNTER — HOSPITAL ENCOUNTER (OUTPATIENT)
Dept: RADIOLOGY | Facility: MEDICAL CENTER | Age: 40
End: 2025-05-15
Attending: NURSE PRACTITIONER
Payer: COMMERCIAL

## 2025-05-15 DIAGNOSIS — Z12.31 SCREENING MAMMOGRAM, ENCOUNTER FOR: ICD-10-CM

## 2025-05-15 PROCEDURE — 77067 SCR MAMMO BI INCL CAD: CPT

## 2025-06-02 ENCOUNTER — RESULTS FOLLOW-UP (OUTPATIENT)
Dept: MEDICAL GROUP | Facility: IMAGING CENTER | Age: 40
End: 2025-06-02

## (undated) DEVICE — GLOVE SZ 6 BIOGEL PI MICRO - PF LF (50PR/BX 4BX/CA)

## (undated) DEVICE — MANIFOLD NEPTUNE 1 PORT (20/PK)

## (undated) DEVICE — CANISTER SUCTION 3000ML MECHANICAL FILTER AUTO SHUTOFF MEDI-VAC NONSTERILE LF DISP  (40EA/CA)

## (undated) DEVICE — GLOVE BIOGEL INDICATOR SZ 6.5 SURGICAL PF LTX - (50PR/BX 4BX/CA)

## (undated) DEVICE — SENSOR SPO2 NEO LNCS ADHESIVE (20/BX) SEE USER NOTES

## (undated) DEVICE — SLEEVE VASO CALF MED - (10PR/CA)

## (undated) DEVICE — GLOVE BIOGEL SZ 6 PF LATEX - (50EA/BX 4BX/CA)

## (undated) DEVICE — SET LEADWIRE 5 LEAD BEDSIDE DISPOSABLE ECG (1SET OF 5/EA)

## (undated) DEVICE — GOWN SURGEONS X-LARGE - DISP. (30/CA)

## (undated) DEVICE — SUTURE 4-0 MONOCRYL PLUS PS-2 - 27 INCH (36/BX)

## (undated) DEVICE — SUTURE GENERAL

## (undated) DEVICE — BANDAID SHEER STRIP 3/4 IN (100EA/BX 12BX/CA)

## (undated) DEVICE — PACK LAPAROSCOPY - (1/CA)

## (undated) DEVICE — PROTECTOR ULNA NERVE - (36PR/CA)

## (undated) DEVICE — BANDAID X-LARGE 2 X 4 IN LF (50EA/BX)

## (undated) DEVICE — SODIUM CHL IRRIGATION 0.9% 1000ML (12EA/CA)

## (undated) DEVICE — NEEDLE INSFL 120MM 14GA VRRS - (20/BX)

## (undated) DEVICE — KIT  I.V. START (100EA/CA)

## (undated) DEVICE — TROCAR 5X100 NON BLADED Z-TH - READ KII (6/BX)

## (undated) DEVICE — CATHETER IV 20 GA X 1-1/4 ---SURG.& SDS ONLY--- (50EA/BX)

## (undated) DEVICE — NEPTUNE 4 PORT MANIFOLD - (20/PK)

## (undated) DEVICE — CANISTER SUCTION RIGID RED 1500CC (40EA/CA)

## (undated) DEVICE — GLOVE BIOGEL PI ORTHO SZ 6 SURGICAL PF LF (40PR/BX)

## (undated) DEVICE — GOWN WARMING STANDARD FLEX - (30/CA)

## (undated) DEVICE — SET EXTENSION WITH 2 PORTS (48EA/CA) ***PART #2C8610 IS A SUBSTITUTE*****

## (undated) DEVICE — PAD SANITARY 11IN MAXI IND WRAPPED  (12EA/PK 24PK/CA)

## (undated) DEVICE — ARMREST CRADLE FOAM - (2PR/PK 12PR/CA)

## (undated) DEVICE — CHLORAPREP 26 ML APPLICATOR - ORANGE TINT(25/CA)

## (undated) DEVICE — LACTATED RINGERS INJ 1000 ML - (14EA/CA 60CA/PF)

## (undated) DEVICE — KIT ANESTHESIA W/CIRCUIT & 3/LT BAG W/FILTER (20EA/CA)

## (undated) DEVICE — TROCAR 5X100 BLADED ADVANCE - FIXATION (6/BX)

## (undated) DEVICE — WATER IRRIGATION STERILE 1000ML (12EA/CA)

## (undated) DEVICE — ELECTRODE 850 FOAM ADHESIVE - HYDROGEL RADIOTRNSPRNT (50/PK)

## (undated) DEVICE — DERMABOND ADVANCED - (12EA/BX)

## (undated) DEVICE — TUBING CLEARLINK DUO-VENT - C-FLO (48EA/CA)

## (undated) DEVICE — SUCTION INSTRUMENT YANKAUER BULBOUS TIP W/O VENT (50EA/CA)

## (undated) DEVICE — MASK ANESTHESIA ADULT  - (100/CA)

## (undated) DEVICE — TUBE CONNECTING SUCTION - CLEAR PLASTIC STERILE 72 IN (50EA/CA)

## (undated) DEVICE — TRAY SRGPRP PVP IOD WT PRP - (20/CA)

## (undated) DEVICE — HEAD HOLDER JUNIOR/ADULT